# Patient Record
Sex: MALE | Race: WHITE | NOT HISPANIC OR LATINO | Employment: FULL TIME | URBAN - METROPOLITAN AREA
[De-identification: names, ages, dates, MRNs, and addresses within clinical notes are randomized per-mention and may not be internally consistent; named-entity substitution may affect disease eponyms.]

---

## 2017-03-25 ENCOUNTER — TRANSFERRED RECORDS (OUTPATIENT)
Dept: HEALTH INFORMATION MANAGEMENT | Facility: CLINIC | Age: 42
End: 2017-03-25

## 2017-03-25 LAB
ALT SERPL-CCNC: 54 U/L (ref 9–46)
AST SERPL-CCNC: 43 U/L (ref 10–40)
CHOLEST SERPL-MCNC: 181 MG/DL (ref 125–200)
CREAT SERPL-MCNC: 1.08 MG/DL (ref 0.6–1.35)
GFR SERPL CREATININE-BSD FRML MDRD: 84 ML/MIN/1.73M2
GLUCOSE SERPL-MCNC: 99 MG/DL (ref 65–99)
HDLC SERPL-MCNC: 29 MG/DL
LDLC SERPL CALC-MCNC: 77 MG/DL
NONHDLC SERPL-MCNC: 152 MG/DL
POTASSIUM SERPL-SCNC: 4.3 MMOL/L (ref 3.5–5.3)
TRIGL SERPL-MCNC: 376 MG/DL

## 2017-04-07 ENCOUNTER — OFFICE VISIT (OUTPATIENT)
Dept: FAMILY MEDICINE | Facility: CLINIC | Age: 42
End: 2017-04-07
Payer: COMMERCIAL

## 2017-04-07 VITALS
BODY MASS INDEX: 34.22 KG/M2 | SYSTOLIC BLOOD PRESSURE: 136 MMHG | HEIGHT: 70 IN | HEART RATE: 62 BPM | TEMPERATURE: 98.3 F | WEIGHT: 239 LBS | DIASTOLIC BLOOD PRESSURE: 86 MMHG | RESPIRATION RATE: 16 BRPM

## 2017-04-07 DIAGNOSIS — R09.81 NASAL CONGESTION: ICD-10-CM

## 2017-04-07 DIAGNOSIS — I10 BENIGN HYPERTENSION: Primary | ICD-10-CM

## 2017-04-07 DIAGNOSIS — E78.5 HYPERLIPIDEMIA LDL GOAL <160: ICD-10-CM

## 2017-04-07 PROCEDURE — 99213 OFFICE O/P EST LOW 20 MIN: CPT | Performed by: PHYSICIAN ASSISTANT

## 2017-04-07 RX ORDER — FLUTICASONE PROPIONATE 50 MCG
1-2 SPRAY, SUSPENSION (ML) NASAL DAILY
Qty: 16 G | Refills: 3 | Status: SHIPPED | OUTPATIENT
Start: 2017-04-07 | End: 2018-04-24

## 2017-04-07 RX ORDER — LISINOPRIL 10 MG/1
10 TABLET ORAL DAILY
Qty: 90 TABLET | Refills: 3 | Status: SHIPPED | OUTPATIENT
Start: 2017-04-07 | End: 2018-04-24

## 2017-04-07 RX ORDER — CETIRIZINE HYDROCHLORIDE 10 MG/1
10 TABLET ORAL DAILY
COMMUNITY

## 2017-04-07 RX ORDER — ATORVASTATIN CALCIUM 20 MG/1
20 TABLET, FILM COATED ORAL DAILY
Qty: 90 TABLET | Refills: 3 | Status: SHIPPED | OUTPATIENT
Start: 2017-04-07 | End: 2018-04-24

## 2017-04-07 NOTE — PROGRESS NOTES
SUBJECTIVE:                                                    Sascha Pacheco is a 41 year old male who presents to clinic today for the following health issues:      Hypertension Follow-up      Outpatient blood pressures are not being checked. Did have BP checked 2 weeks ago at px normal 120/80    Low Salt Diet: not monitoring salt       Amount of exercise or physical activity: 3-4 days/week    Problems taking medications regularly: No    Medication side effects:     Diet: regular (no restrictions)      Hyperlipidemia Follow-Up      Rate your low fat/cholesterol diet?: not monitoring fat    Taking statin?  Yes, no muscle aches from statin. However has noted over the last year has been experiencing new nasal congestion and headaches and troubles staying.     Other lipid medications/supplements?:  none       CMP, CBC, and lipids were all checked 2 weeks ago through insurance. Triglycerides were ~350 and hdl was 29. Renal function, blood sugar, and electrolytes were all normal. ALT was 54 and the remainder of LFTs normal. CBC was normal.       Problem list and histories reviewed & adjusted, as indicated.  Additional history: as documented    Patient Active Problem List   Diagnosis     History of Hodgkin's disease     Hyperlipidemia LDL goal <160     Glaucoma     Arthropathy of multiple sites     Shoulder pain     Personal history of radiation therapy     Benign hypertension     Osteoarthritis of glenohumeral joint     Past Surgical History:   Procedure Laterality Date     ARTHROSCOPY SHOULDER DECOMPRESSION  12/15/2011    Procedure:ARTHROSCOPY SHOULDER DECOMPRESSION; Right Shoulder Arthroscopic Assessment, Subcromial Decompression, microfracture cohondrail defect repair  ; Surgeon:CAROLE WALKER; Location:[facility] OR     C APPENDECTOMY       HC KNEE SCOPE,MED/LAT MENISECTOMY  12/2004    medial meniscectomy, right knee     HC VASECTOMY UNILAT/BILAT W POSTOP SEMEN       SURGICAL HISTORY OF -       resection Hodgkins,  "salivary glands, node dissection       Social History   Substance Use Topics     Smoking status: Former Smoker     Quit date: 1/1/1996     Smokeless tobacco: Never Used     Alcohol use 0.0 oz/week     0 Standard drinks or equivalent per week      Comment: rarely     Family History   Problem Relation Age of Onset     CANCER Mother      melanoma     C.A.D. Maternal Grandfather      DIABETES Maternal Aunt          Current Outpatient Prescriptions   Medication Sig Dispense Refill     lisinopril (PRINIVIL/ZESTRIL) 10 MG tablet Take 1 tablet (10 mg) by mouth daily 90 tablet 3     atorvastatin (LIPITOR) 20 MG tablet Take 1 tablet (20 mg) by mouth daily 90 tablet 3     fluticasone (FLONASE) 50 MCG/ACT spray Spray 1-2 sprays into both nostrils daily 16 g 3     cetirizine (ZYRTEC) 10 MG tablet Take 10 mg by mouth daily       latanoprost (XALATAN) 0.005 % ophthalmic solution Place 1 drop into both eyes At Bedtime.       [DISCONTINUED] lisinopril (PRINIVIL,ZESTRIL) 10 MG tablet Take 1 tablet (10 mg) by mouth daily 90 tablet 3     Allergies   Allergen Reactions     No Known Allergies      BP Readings from Last 3 Encounters:   04/07/17 136/86   06/03/16 110/70   05/15/16 (!) 164/100    Wt Readings from Last 3 Encounters:   04/07/17 239 lb (108.4 kg)   06/03/16 225 lb (102.1 kg)   05/15/16 225 lb (102.1 kg)                    Reviewed and updated as needed this visit by clinical staff  Tobacco  Allergies  Meds  Problems  Med Hx  Surg Hx  Fam Hx  Soc Hx        Reviewed and updated as needed this visit by Provider  Allergies  Meds  Problems         ROS:  Constitutional, HEENT, cardiovascular, pulmonary, gi and gu systems are negative, except as otherwise noted.    OBJECTIVE:                                                    /86 (BP Location: Right arm, Patient Position: Chair, Cuff Size: Adult Large)  Pulse 62  Temp 98.3  F (36.8  C) (Oral)  Resp 16  Ht 5' 10\" (1.778 m)  Wt 239 lb (108.4 kg)  BMI 34.29 " kg/m2  Body mass index is 34.29 kg/(m^2).  GENERAL: healthy, alert, no distress and over weight  EYES: Eyes grossly normal to inspection, PERRL and conjunctivae and sclerae normal  HENT: normal cephalic/atraumatic, both ears: clear effusion, nasal mucosa edematous , oropharynx clear, oral mucous membranes moist and sinuses: not tender  NECK: no adenopathy, no asymmetry, masses, or scars and thyroid normal to palpation  RESP: lungs clear to auscultation - no rales, rhonchi or wheezes  CV: regular rates and rhythm, normal S1 S2, no S3 or S4 and no murmur, click or rub  ABDOMEN: soft, nontender, no hepatosplenomegaly, no masses and bowel sounds normal  PSYCH: mentation appears normal, affect normal/bright    Diagnostic Test Results:  none      ASSESSMENT/PLAN:                                                      (I10) Benign hypertension  (primary encounter diagnosis)  Comment: stable. Labs were normal 2 weeks ago. Labs scanned into chart. Refilled yearly follow up recommended.   Plan: lisinopril (PRINIVIL/ZESTRIL) 10 MG tablet        -Medication use and side effects discussed with the patient. Patient is in complete understanding and agreement with plan.       (E78.5) Hyperlipidemia LDL goal <160  Comment: triglycerides and hdl were abnormal. LDL stable. Restlessness at night may be cause of zocor. Will attempt lipitor instead. Patient to monitor for side effects. Maintain yearly labs.   Plan: atorvastatin (LIPITOR) 20 MG tablet        -Medication use and side effects discussed with the patient. Patient is in complete understanding and agreement with plan.       (R09.81) Nasal congestion  Comment: patient informed this is likely allergies. I do not believe this is side effect of medications. Patient does state is currently taking daily zyrtec. Will add flonase to regimen. If no improvement in ~1 month, patient should RTC. Sooner if worsening.   Plan: fluticasone (FLONASE) 50 MCG/ACT spray        -Medication use and  side effects discussed with the patient. Patient is in complete understanding and agreement with plan.         Follow up: as above     Kel Ragland PA-C  [facility]

## 2017-04-07 NOTE — MR AVS SNAPSHOT
After Visit Summary   4/7/2017    Sascha Pacheco    MRN: [id number]           Patient Information     Date Of Birth          1975        Visit Information        Provider Department      4/7/2017 8:45 AM Kel Ragland PA-C [facility]        Today's Diagnoses     Hyperlipidemia LDL goal <160    -  1    Benign hypertension        Nasal congestion          Care Instructions      Sinus Headache    The sinuses are air-filled spaces within the bones of the face. They connect to the inside of the nose. Sinusitis is an inflammation of the tissue lining the sinus cavity. Sinus inflammation can occur during a cold or hay fever (allergies to pollens and other particles in the air) and cause symptoms of sinus congestion and fullness and perhaps a low-grade fever. An infection is usually present when there is also facial pain or headache and green or yellow drainage from the nose or into the back of the throat (postnasal drip). Antibiotics are often prescribed to treat this condition.  Sinus headache may cause pain in different places, depending on which sinuses are infected. There may be pain in the temples, forehead, top of the head, behind or around the eye, across the cheekbone, or into the upper teeth.  You may find that changing your position, sitting upright or lying down, will bring some relief.  Home care  The following guidelines will help you care for yourself at home:  1. Drink plenty of water, hot tea, and other liquids to stay well hydrated. This thins the mucus and promotes sinus drainage.  2. Apply heat to the painful areas of the face. Use a towel soaked in hot water. Or,  the shower and direct the hot spray onto your face. This is a good way to inhale warm water vapor and get heat on your face at the same time. (Cover your mouth and nose with your hands so you can still breathe as you do this.)  3. Use a cool mist vaporizer at night or breathe in  steam from a hot shower. Suck on peppermint, menthol, or eucalyptus hard candies during the day.  4. An expectorant containing guaifenesin helps thin the mucus and promote drainage from the sinuses.  5. Over-the-counter decongestants may be used unless a similar medicine was prescribed. Nasal sprays work the fastest. Use one that contains phenylephrine or oxymetazoline. First blow the nose gently to remove mucus. Then apply the drops. Don't use decongestant nasal sprays more often than the label says or for more than 3 days. This can make symptoms worse. Nasal sprays prescribed from your doctor typically do not have these restrictions. Check with your doctor or pharmacist. You may also use tablets containing pseudoephedrine. Many sinus remedies combine ingredients, which may increase side effects. Also, if you are taking a combination medicine with another medicine, be sure you are not taking a double dose of anything by mistake. Read the labels or ask the pharmacist for help. [NOTE: Those with high blood pressure should not use decongestants. They can raise blood pressure.]  6. Antihistamines are useful if allergies are a cause of your sinusitis. You can get chlorpheniramine and diphenhydramine over the counter, but these can cause drowsiness. [NOTE: Don't use these if you have glaucoma or if you are a man with trouble urinating due to an enlarged prostate.] Over-the-counter antihistamines containing loratidine and cetirizine cause less drowsiness and are a good choice for daytime use.  7. When allergies are the cause for sinusitis, a saline nasal rinse may give relief. Saline nasal rinse reduces swelling and clears excess mucus. This allows sinuses to drain. Prepackaged kits are available at most drugstores. These contain premixed salt packets and an irrigation device. If antibiotics have been prescribed to treat an acute sinus infection, talk to your doctor before using a nasal rinse to be sure it is safe for  you.  8. You may use acetaminophen or ibuprofen to control pain, unless another pain medicine was prescribed. [NOTE: If you have chronic liver or kidney disease or ever had a stomach ulcer, talk with your doctor before using these medicines.] (Aspirin should never be used in anyone under 18 years of age who has a fever. It may cause severe liver damage.)  9. If antibiotics were given, finish all of them, even if you are feeling better after a few days.  Follow-up care  Follow up with your doctor or this facility in 1 week or as instructed by our staff if not improving.  When to seek medical care  Get prompt medical attention if any of the following occur:    Facial pain or headache becomes more severe    Stiff neck    Unusual drowsiness or confusion    Swelling of the forehead or eyelids    Vision problems including blurred or double vision    Fever over 100.4  F (38.0  C) oral for more than 3 days on antibiotics    Bleeding from the nose or throat    Seizure    4699-8660 [employer]. [address] [place] [address]. All rights reserved. This information is not intended as a substitute for professional medical care. Always follow your healthcare professional's instructions.              Follow-ups after your visit        Who to contact     If you have questions or need follow up information about today's clinic visit or your schedule please contact [facility] directly at [phone].  Normal or non-critical lab and imaging results will be communicated to you by MyChart, letter or phone within 4 business days after the clinic has received the results. If you do not hear from us within 7 days, please contact the clinic through MyChart or phone. If you have a critical or abnormal lab result, we will notify you by phone as soon as possible.  Submit refill requests through [employer] or call your pharmacy and they will forward the refill request to us. Please allow 3 business days  "for your refill to be completed.          Additional Information About Your Visit        [employer] Information     [employer] lets you send messages to your doctor, view your test results, renew your prescriptions, schedule appointments and more. To sign up, go to [email or web address] . Click on \"Log in\" on the left side of the screen, which will take you to the Welcome page. Then click on \"Sign up Now\" on the right side of the page.     You will be asked to enter the access code listed below, as well as some personal information. Please follow the directions to create your username and password.     Your access code is: [email or web address]  Expires: 2017  9:08 AM     Your access code will  in 90 days. If you need help or a new code, please call your [place] clinic or [phone].        Care EveryWhere ID     This is your Care EveryWhere ID. This could be used by other organizations to access your [place] medical records  [id number]        Your Vitals Were     Pulse Temperature Respirations Height BMI (Body Mass Index)       62 98.3  F (36.8  C) (Oral) 16 5' 10\" (1.778 m) 34.29 kg/m2        Blood Pressure from Last 3 Encounters:   17 136/86   16 110/70   05/15/16 (!) 164/100    Weight from Last 3 Encounters:   17 239 lb (108.4 kg)   16 225 lb (102.1 kg)   05/15/16 225 lb (102.1 kg)              Today, you had the following     No orders found for display         Today's Medication Changes          These changes are accurate as of: 17  9:08 AM.  If you have any questions, ask your nurse or doctor.               Start taking these medicines.        Dose/Directions    atorvastatin 20 MG tablet   Commonly known as:  LIPITOR   Used for:  Hyperlipidemia LDL goal <160   Started by:  Kel Ragland PA-C        Dose:  20 mg   Take 1 tablet (20 mg) by mouth daily   Quantity:  90 tablet   Refills:  3       fluticasone 50 MCG/ACT spray   Commonly known as:  FLONASE   Used for:  Nasal " congestion   Started by:  Kel Ragland PA-C        Dose:  1-2 spray   Spray 1-2 sprays into both nostrils daily   Quantity:  16 g   Refills:  3         Stop taking these medicines if you haven't already. Please contact your care team if you have questions.     simvastatin 20 MG tablet   Commonly known as:  ZOCOR   Stopped by:  Kel Ragland PA-C                Where to get your medicines      These medications were sent to [id number] - [place], [place] - [address]  [address] , [facility] [address]     Phone:  [phone]     atorvastatin 20 MG tablet    fluticasone 50 MCG/ACT spray    lisinopril 10 MG tablet                Primary Care Provider Office Phone # Fax #    Rob Coronado [phone]       [facility] [address] [place] [address] [address] [place] [address]        Thank you!     Thank you for choosing [facility]  for your care. Our goal is always to provide you with excellent care. Hearing back from our patients is one way we can continue to improve our services. Please take a few minutes to complete the written survey that you may receive in the mail after your visit with us. Thank you!             Your Updated Medication List - Protect others around you: Learn how to safely use, store and throw away your medicines at www.disposemymeds.org.          This list is accurate as of: 4/7/17  9:08 AM.  Always use your most recent med list.                   Brand Name Dispense Instructions for use    atorvastatin 20 MG tablet    LIPITOR    90 tablet    Take 1 tablet (20 mg) by mouth daily       fluticasone 50 MCG/ACT spray    FLONASE    16 g    Spray 1-2 sprays into both nostrils daily       lisinopril 10 MG tablet    PRINIVIL/ZESTRIL    90 tablet    Take 1 tablet (10 mg) by mouth daily       XALATAN 0.005 % ophthalmic solution   Generic drug:  latanoprost      Place 1 drop into both eyes At Bedtime.

## 2017-04-07 NOTE — NURSING NOTE
"Chief Complaint   Patient presents with     Hypertension       Initial /86 (BP Location: Right arm, Patient Position: Chair, Cuff Size: Adult Large)  Pulse 62  Temp 98.3  F (36.8  C) (Oral)  Resp 16  Ht 5' 10\" (1.778 m)  Wt 239 lb (108.4 kg)  BMI 34.29 kg/m2 Estimated body mass index is 34.29 kg/(m^2) as calculated from the following:    Height as of this encounter: 5' 10\" (1.778 m).    Weight as of this encounter: 239 lb (108.4 kg).  Medication Reconciliation: complete    "

## 2017-04-07 NOTE — PATIENT INSTRUCTIONS
Sinus Headache    The sinuses are air-filled spaces within the bones of the face. They connect to the inside of the nose. Sinusitis is an inflammation of the tissue lining the sinus cavity. Sinus inflammation can occur during a cold or hay fever (allergies to pollens and other particles in the air) and cause symptoms of sinus congestion and fullness and perhaps a low-grade fever. An infection is usually present when there is also facial pain or headache and green or yellow drainage from the nose or into the back of the throat (postnasal drip). Antibiotics are often prescribed to treat this condition.  Sinus headache may cause pain in different places, depending on which sinuses are infected. There may be pain in the temples, forehead, top of the head, behind or around the eye, across the cheekbone, or into the upper teeth.  You may find that changing your position, sitting upright or lying down, will bring some relief.  Home care  The following guidelines will help you care for yourself at home:  1. Drink plenty of water, hot tea, and other liquids to stay well hydrated. This thins the mucus and promotes sinus drainage.  2. Apply heat to the painful areas of the face. Use a towel soaked in hot water. Or,  the shower and direct the hot spray onto your face. This is a good way to inhale warm water vapor and get heat on your face at the same time. (Cover your mouth and nose with your hands so you can still breathe as you do this.)  3. Use a cool mist vaporizer at night or breathe in steam from a hot shower. Suck on peppermint, menthol, or eucalyptus hard candies during the day.  4. An expectorant containing guaifenesin helps thin the mucus and promote drainage from the sinuses.  5. Over-the-counter decongestants may be used unless a similar medicine was prescribed. Nasal sprays work the fastest. Use one that contains phenylephrine or oxymetazoline. First blow the nose gently to remove mucus. Then apply the  drops. Don't use decongestant nasal sprays more often than the label says or for more than 3 days. This can make symptoms worse. Nasal sprays prescribed from your doctor typically do not have these restrictions. Check with your doctor or pharmacist. You may also use tablets containing pseudoephedrine. Many sinus remedies combine ingredients, which may increase side effects. Also, if you are taking a combination medicine with another medicine, be sure you are not taking a double dose of anything by mistake. Read the labels or ask the pharmacist for help. [NOTE: Those with high blood pressure should not use decongestants. They can raise blood pressure.]  6. Antihistamines are useful if allergies are a cause of your sinusitis. You can get chlorpheniramine and diphenhydramine over the counter, but these can cause drowsiness. [NOTE: Don't use these if you have glaucoma or if you are a man with trouble urinating due to an enlarged prostate.] Over-the-counter antihistamines containing loratidine and cetirizine cause less drowsiness and are a good choice for daytime use.  7. When allergies are the cause for sinusitis, a saline nasal rinse may give relief. Saline nasal rinse reduces swelling and clears excess mucus. This allows sinuses to drain. Prepackaged kits are available at [facility]. These contain premixed salt packets and an irrigation device. If antibiotics have been prescribed to treat an acute sinus infection, talk to your doctor before using a nasal rinse to be sure it is safe for you.  8. You may use acetaminophen or ibuprofen to control pain, unless another pain medicine was prescribed. [NOTE: If you have chronic liver or kidney disease or ever had a stomach ulcer, talk with your doctor before using these medicines.] (Aspirin should never be used in anyone under 18 years of age who has a fever. It may cause severe liver damage.)  9. If antibiotics were given, finish all of them, even if you are feeling  better after a few days.  Follow-up care  Follow up with your doctor or this facility in 1 week or as instructed by our staff if not improving.  When to seek medical care  Get prompt medical attention if any of the following occur:    Facial pain or headache becomes more severe    Stiff neck    Unusual drowsiness or confusion    Swelling of the forehead or eyelids    Vision problems including blurred or double vision    Fever over 100.4  F (38.0  C) oral for more than 3 days on antibiotics    Bleeding from the nose or throat    Seizure    8506-0757 [employer]. [address] [place] [address]. All rights reserved. This information is not intended as a substitute for professional medical care. Always follow your healthcare professional's instructions.

## 2018-03-24 ENCOUNTER — TRANSFERRED RECORDS (OUTPATIENT)
Dept: HEALTH INFORMATION MANAGEMENT | Facility: CLINIC | Age: 43
End: 2018-03-24

## 2018-03-24 LAB
ALT SERPL-CCNC: 43 U/L (ref 9–46)
AST SERPL-CCNC: 40 U/L (ref 10–40)
CHOLEST SERPL-MCNC: 181 MG/DL
CREAT SERPL-MCNC: 1.16 MG/DL (ref 0.6–1.35)
GFR SERPL CREATININE-BSD FRML MDRD: 77 ML/MIN/1.73M2
GLUCOSE SERPL-MCNC: 91 MG/DL (ref 65–99)
HDLC SERPL-MCNC: 29 MG/DL
LDLC SERPL CALC-MCNC: 117 MG/DL
NONHDLC SERPL-MCNC: 152 MG/DL
POTASSIUM SERPL-SCNC: 4.2 MMOL/L (ref 3.5–5.3)
TRIGL SERPL-MCNC: 224 MG/DL

## 2018-04-24 ENCOUNTER — OFFICE VISIT (OUTPATIENT)
Dept: FAMILY MEDICINE | Facility: CLINIC | Age: 43
End: 2018-04-24
Payer: COMMERCIAL

## 2018-04-24 VITALS
SYSTOLIC BLOOD PRESSURE: 134 MMHG | HEIGHT: 69 IN | BODY MASS INDEX: 33.77 KG/M2 | TEMPERATURE: 98 F | RESPIRATION RATE: 12 BRPM | DIASTOLIC BLOOD PRESSURE: 78 MMHG | WEIGHT: 228 LBS | HEART RATE: 56 BPM

## 2018-04-24 DIAGNOSIS — E78.5 HYPERLIPIDEMIA LDL GOAL <160: Primary | ICD-10-CM

## 2018-04-24 DIAGNOSIS — R09.81 NASAL CONGESTION: ICD-10-CM

## 2018-04-24 DIAGNOSIS — I10 BENIGN HYPERTENSION: ICD-10-CM

## 2018-04-24 PROCEDURE — 99213 OFFICE O/P EST LOW 20 MIN: CPT | Performed by: PHYSICIAN ASSISTANT

## 2018-04-24 RX ORDER — ATORVASTATIN CALCIUM 20 MG/1
20 TABLET, FILM COATED ORAL DAILY
Qty: 90 TABLET | Refills: 3 | Status: SHIPPED | OUTPATIENT
Start: 2018-04-24 | End: 2019-03-18

## 2018-04-24 RX ORDER — LISINOPRIL 10 MG/1
10 TABLET ORAL DAILY
Qty: 90 TABLET | Refills: 3 | Status: SHIPPED | OUTPATIENT
Start: 2018-04-24 | End: 2019-03-18

## 2018-04-24 RX ORDER — FLUTICASONE PROPIONATE 50 MCG
1-2 SPRAY, SUSPENSION (ML) NASAL DAILY
Qty: 16 G | Refills: 11 | Status: SHIPPED | OUTPATIENT
Start: 2018-04-24 | End: 2019-06-28

## 2018-04-24 ASSESSMENT — PATIENT HEALTH QUESTIONNAIRE - PHQ9
10. IF YOU CHECKED OFF ANY PROBLEMS, HOW DIFFICULT HAVE THESE PROBLEMS MADE IT FOR YOU TO DO YOUR WORK, TAKE CARE OF THINGS AT HOME, OR GET ALONG WITH OTHER PEOPLE: NOT DIFFICULT AT ALL
SUM OF ALL RESPONSES TO PHQ QUESTIONS 1-9: 2
SUM OF ALL RESPONSES TO PHQ QUESTIONS 1-9: 2

## 2018-04-24 NOTE — MR AVS SNAPSHOT
"              After Visit Summary   2018    Sascha Pacheco    MRN: [id number]           Patient Information     Date Of Birth          1975        Visit Information        Provider Department      2018 2:45 PM Kel Ragland PA-C [facility]        Today's Diagnoses     Nasal congestion        Hyperlipidemia LDL goal <160        Benign hypertension           Follow-ups after your visit        Who to contact     If you have questions or need follow up information about today's clinic visit or your schedule please contact [facility] directly at [phone].  Normal or non-critical lab and imaging results will be communicated to you by [employer], letter or phone within 4 business days after the clinic has received the results. If you do not hear from us within 7 days, please contact the clinic through [employer] or phone. If you have a critical or abnormal lab result, we will notify you by phone as soon as possible.  Submit refill requests through [employer] or call your pharmacy and they will forward the refill request to us. Please allow 3 business days for your refill to be completed.          Additional Information About Your Visit        MyChart Information     [employer] lets you send messages to your doctor, view your test results, renew your prescriptions, schedule appointments and more. To sign up, go to [email or web address] . Click on \"Log in\" on the left side of the screen, which will take you to the Welcome page. Then click on \"Sign up Now\" on the right side of the page.     You will be asked to enter the access code listed below, as well as some personal information. Please follow the directions to create your username and password.     Your access code is: [email or web address]  Expires: 2018  3:35 PM     Your access code will  in 90 days. If you need help or a new code, please call your [facility] or [phone].        Care EveryWhere " "ID     This is your Care EveryWhere ID. This could be used by other organizations to access your [place] medical records  [id number]        Your Vitals Were     Pulse Temperature Respirations Height BMI (Body Mass Index)       56 98  F (36.7  C) (Oral) 12 5' 9\" (1.753 m) 33.67 kg/m2        Blood Pressure from Last 3 Encounters:   04/24/18 134/78   04/07/17 136/86   06/03/16 110/70    Weight from Last 3 Encounters:   04/24/18 228 lb (103.4 kg)   04/07/17 239 lb (108.4 kg)   06/03/16 225 lb (102.1 kg)              Today, you had the following     No orders found for display         Where to get your medicines      These medications were sent to [id number] - [place], [place] - [address]  [address], [facility] [address]     Phone:  [phone]     atorvastatin 20 MG tablet    fluticasone 50 MCG/ACT spray    lisinopril 10 MG tablet          Primary Care Provider Office Phone # Fax #    Rob Coronado [phone]       [facility] [address]        Equal Access to Services     [facility] AH: Hadii aad ku hadasho Soomaali, waaxda luqadaha, qaybta kaalmada adeegyada, alexi gilbert. So [facility] [phone].    ATENCIÓN: Si habla español, tiene a avila disposición servicios gratuitos de asistencia lingüística. Violette al [phone].    We comply with applicable federal civil rights laws and [place] laws. We do not discriminate on the basis of race, color, national origin, age, disability, sex, sexual orientation, or gender identity.            Thank you!     Thank you for choosing [facility]  for your care. Our goal is always to provide you with excellent care. Hearing back from our patients is one way we can continue to improve our services. Please take a few minutes to complete the written survey that you may receive in the mail after your visit with us. Thank you!             Your Updated Medication List - Protect others " around you: Learn how to safely use, store and throw away your medicines at www.disposemymeds.org.          This list is accurate as of 4/24/18  3:35 PM.  Always use your most recent med list.                   Brand Name Dispense Instructions for use Diagnosis    atorvastatin 20 MG tablet    LIPITOR    90 tablet    Take 1 tablet (20 mg) by mouth daily    Hyperlipidemia LDL goal <160       cetirizine 10 MG tablet    zyrTEC     Take 10 mg by mouth daily        fluticasone 50 MCG/ACT spray    FLONASE    16 g    Spray 1-2 sprays into both nostrils daily    Nasal congestion       lisinopril 10 MG tablet    PRINIVIL/ZESTRIL    90 tablet    Take 1 tablet (10 mg) by mouth daily    Benign hypertension       XALATAN 0.005 % ophthalmic solution   Generic drug:  latanoprost      Place 1 drop into both eyes At Bedtime.

## 2018-04-24 NOTE — PROGRESS NOTES
SUBJECTIVE:   Sascha Pacheco is a 42 year old male who presents to clinic today for the following health issues:    Hyperlipidemia Follow-Up      Rate your low fat/cholesterol diet?: fair    Taking statin?  Yes, possible muscle aches from statin    Other lipid medications/supplements?:  None    Obtains labs through work.   Total cholesterol 181  hdl 29  Triglycerides 234  Ldl: 117    Hypertension Follow-up      Outpatient blood pressures are being checked at store.  Results are 120/80.    Low Salt Diet: low salt      Amount of exercise or physical activity: None    Problems taking medications regularly: No    Medication side effects: muscle aches    Diet: low salt    Cr: 1.16  GFR: 77  Potassium: 4.2      Answers for HPI/ROS submitted by the patient on 4/24/2018   If you checked off any problems, how difficult have these problems made it for you to do your work, take care of things at home, or get along with other people?: Not difficult at all  PHQ9 TOTAL SCORE: 2        Problem list and histories reviewed & adjusted, as indicated.  Additional history: as documented    Patient Active Problem List   Diagnosis     History of Hodgkin's disease     Hyperlipidemia LDL goal <160     Glaucoma     Arthropathy of multiple sites     Shoulder pain     Personal history of radiation therapy     Benign hypertension     Osteoarthritis of glenohumeral joint     Past Surgical History:   Procedure Laterality Date     ARTHROSCOPY SHOULDER DECOMPRESSION  12/15/2011    Procedure:ARTHROSCOPY SHOULDER DECOMPRESSION; Right Shoulder Arthroscopic Assessment, Subcromial Decompression, microfracture cohondrail defect repair  ; Surgeon:CAROLE WALKER; Location:[facility] OR     C APPENDECTOMY       HC KNEE SCOPE,MED/LAT MENISECTOMY  12/2004    medial meniscectomy, right knee     HC VASECTOMY UNILAT/BILAT W POSTOP SEMEN       SURGICAL HISTORY OF -       resection Hodgkins, salivary glands, node dissection       Social History   Substance Use  "Topics     Smoking status: Former Smoker     Quit date: 1/1/1996     Smokeless tobacco: Never Used     Alcohol use 0.0 oz/week     0 Standard drinks or equivalent per week      Comment: rarely     Family History   Problem Relation Age of Onset     CANCER Mother      melanoma     C.A.D. Maternal Grandfather      DIABETES Maternal Aunt          Current Outpatient Prescriptions   Medication Sig Dispense Refill     atorvastatin (LIPITOR) 20 MG tablet Take 1 tablet (20 mg) by mouth daily 90 tablet 3     cetirizine (ZYRTEC) 10 MG tablet Take 10 mg by mouth daily       fluticasone (FLONASE) 50 MCG/ACT spray Spray 1-2 sprays into both nostrils daily 16 g 11     latanoprost (XALATAN) 0.005 % ophthalmic solution Place 1 drop into both eyes At Bedtime.       lisinopril (PRINIVIL/ZESTRIL) 10 MG tablet Take 1 tablet (10 mg) by mouth daily 90 tablet 3     Allergies   Allergen Reactions     No Known Allergies      BP Readings from Last 3 Encounters:   04/24/18 134/78   04/07/17 136/86   06/03/16 110/70    Wt Readings from Last 3 Encounters:   04/24/18 228 lb (103.4 kg)   04/07/17 239 lb (108.4 kg)   06/03/16 225 lb (102.1 kg)                    Reviewed and updated as needed this visit by clinical staff  Tobacco  Allergies  Meds  Problems  Med Hx  Surg Hx  Fam Hx  Soc Hx        Reviewed and updated as needed this visit by Provider  Allergies  Meds  Problems         ROS:  Constitutional, msk, neuro, cardiovascular, pulmonary, gi and gu systems are negative, except as otherwise noted.    OBJECTIVE:     /78 (BP Location: Right arm, Patient Position: Chair, Cuff Size: Adult Large)  Pulse 56  Temp 98  F (36.7  C) (Oral)  Resp 12  Ht 5' 9\" (1.753 m)  Wt 228 lb (103.4 kg)  BMI 33.67 kg/m2  Body mass index is 33.67 kg/(m^2).  GENERAL: alert, no distress and obese  RESP: lungs clear to auscultation - no rales, rhonchi or wheezes  CV: regular rates and rhythm, normal S1 S2, no S3 or S4 and no murmur, click or " rub  PSYCH: mentation appears normal, affect normal/bright    Diagnostic Test Results:  none     ASSESSMENT/PLAN:     (E78.5) Hyperlipidemia LDL goal <160  (primary encounter diagnosis)  Comment: given muscle aches, will take holiday from lipitor. If symptoms resolve in 1 month, patient to call and pravastatin will be attempted. If no improvement, likely not resulting in lipitor and can be restarted.   Plan: atorvastatin (LIPITOR) 20 MG tablet        -Medication use and side effects discussed with the patient. Patient is in complete understanding and agreement with plan.       (I10) Benign hypertension  Comment: stable   Plan: lisinopril (PRINIVIL/ZESTRIL) 10 MG tablet            (R09.81) Nasal congestion  Comment:   Plan: fluticasone (FLONASE) 50 MCG/ACT spray              Follow up: as above     Kel Ragland PA-C  [facility]

## 2018-04-25 ASSESSMENT — PATIENT HEALTH QUESTIONNAIRE - PHQ9: SUM OF ALL RESPONSES TO PHQ QUESTIONS 1-9: 2

## 2018-11-23 ENCOUNTER — OFFICE VISIT (OUTPATIENT)
Dept: URGENT CARE | Facility: URGENT CARE | Age: 43
End: 2018-11-23
Payer: COMMERCIAL

## 2018-11-23 VITALS
HEART RATE: 67 BPM | DIASTOLIC BLOOD PRESSURE: 80 MMHG | WEIGHT: 237 LBS | TEMPERATURE: 98.5 F | SYSTOLIC BLOOD PRESSURE: 140 MMHG | OXYGEN SATURATION: 96 % | BODY MASS INDEX: 35 KG/M2

## 2018-11-23 DIAGNOSIS — B35.6 TINEA CRURIS: Primary | ICD-10-CM

## 2018-11-23 PROCEDURE — 99213 OFFICE O/P EST LOW 20 MIN: CPT | Performed by: PHYSICIAN ASSISTANT

## 2018-11-23 RX ORDER — NYSTATIN 100000 U/G
CREAM TOPICAL 3 TIMES DAILY
Qty: 30 G | Refills: 1 | Status: SHIPPED | OUTPATIENT
Start: 2018-11-23 | End: 2019-02-18

## 2018-11-23 ASSESSMENT — ENCOUNTER SYMPTOMS
DIARRHEA: 0
NAUSEA: 0
CHILLS: 0
FEVER: 0
VOMITING: 0
COUGH: 0

## 2018-11-23 NOTE — MR AVS SNAPSHOT
"              After Visit Summary   2018    Sascha Pacheco    MRN: [id number]           Patient Information     Date Of Birth          1975        Visit Information        Provider Department      2018 2:10 PM Adrienne Phelps PA-C [facility]        Today's Diagnoses     Tinea cruris    -  1       Follow-ups after your visit        Who to contact     If you have questions or need follow up information about today's clinic visit or your schedule please contact [facility] directly at [phone].  Normal or non-critical lab and imaging results will be communicated to you by [employer], letter or phone within 4 business days after the clinic has received the results. If you do not hear from us within 7 days, please contact the clinic through [employer] or phone. If you have a critical or abnormal lab result, we will notify you by phone as soon as possible.  Submit refill requests through [employer] or call your pharmacy and they will forward the refill request to us. Please allow 3 business days for your refill to be completed.          Additional Information About Your Visit        MyChart Information     [employer] lets you send messages to your doctor, view your test results, renew your prescriptions, schedule appointments and more. To sign up, go to [email or web address] . Click on \"Log in\" on the left side of the screen, which will take you to the Welcome page. Then click on \"Sign up Now\" on the right side of the page.     You will be asked to enter the access code listed below, as well as some personal information. Please follow the directions to create your username and password.     Your access code is: [email or web address]  Expires: 2019  4:30 PM     Your access code will  in 90 days. If you need help or a new code, please call your [place] clinic or [phone].        Care EveryWhere ID     This is your Care EveryWhere ID. This could be used by " other organizations to access your [place] medical records  [id number]        Your Vitals Were     Pulse Temperature Pulse Oximetry BMI (Body Mass Index)          67 98.5  F (36.9  C) (Oral) 96% 35 kg/m2         Blood Pressure from Last 3 Encounters:   11/23/18 140/80   04/24/18 134/78   04/07/17 136/86    Weight from Last 3 Encounters:   11/23/18 237 lb (107.5 kg)   04/24/18 228 lb (103.4 kg)   04/07/17 239 lb (108.4 kg)              Today, you had the following     No orders found for display         Today's Medication Changes          These changes are accurate as of 11/23/18  4:30 PM.  If you have any questions, ask your nurse or doctor.               Start taking these medicines.        Dose/Directions    nystatin cream   Commonly known as:  MYCOSTATIN   Used for:  Tinea cruris   Started by:  Adrienne Phelps PA-C        Apply topically 3 times daily for 14 days To affected areas   Quantity:  30 g   Refills:  1            Where to get your medicines      These medications were sent to [id number] - [place], [place] - [address]  [address], [facility] [address]     Phone:  [phone]     nystatin cream                Primary Care Provider Office Phone # Fax #    Rob Coronado [phone]       [facility] [place] [address]        Equal Access to Services     [facility] AH: Hadii landon barnhart hadasho Sojessicaali, waaxda luqadaha, qaybta kaalmada jaime, alexi dupree . So [facility] [phone].    ATENCIÓN: Si habla español, tiene a avila disposición servicios gratuitos de asistencia lingüística. Llame al [phone].    We comply with applicable federal civil rights laws and [place] laws. We do not discriminate on the basis of race, color, national origin, age, disability, sex, sexual orientation, or gender identity.            Thank you!     Thank you for choosing [facility]  for your care. Our goal is always to  provide you with excellent care. Hearing back from our patients is one way we can continue to improve our services. Please take a few minutes to complete the written survey that you may receive in the mail after your visit with us. Thank you!             Your Updated Medication List - Protect others around you: Learn how to safely use, store and throw away your medicines at www.disposemymeds.org.          This list is accurate as of 11/23/18  4:30 PM.  Always use your most recent med list.                   Brand Name Dispense Instructions for use Diagnosis    atorvastatin 20 MG tablet    LIPITOR    90 tablet    Take 1 tablet (20 mg) by mouth daily    Hyperlipidemia LDL goal <160       cetirizine 10 MG tablet    zyrTEC     Take 10 mg by mouth daily        fluticasone 50 MCG/ACT spray    FLONASE    16 g    Spray 1-2 sprays into both nostrils daily    Nasal congestion       lisinopril 10 MG tablet    PRINIVIL/ZESTRIL    90 tablet    Take 1 tablet (10 mg) by mouth daily    Benign hypertension       nystatin cream    MYCOSTATIN    30 g    Apply topically 3 times daily for 14 days To affected areas    Tinea cruris       XALATAN 0.005 % ophthalmic solution   Generic drug:  latanoprost      Place 1 drop into both eyes At Bedtime.

## 2018-11-23 NOTE — PROGRESS NOTES
SUBJECTIVE:   Sascha Pacheco is a 43 year old male presenting with a chief complaint of   Chief Complaint   Patient presents with     Urgent Care     Derm Problem     rash in groin for 2 weeks , tried lotramin, desitin, and aquaphor        He is an established patient of [facility].    Rash    Onset of rash was 2 week(s) ago.   Course of illness is unchanged.  Severity moderate  Current and Associated symptoms: itching   Location of the rash: groin  Previous history of a similar rash? No  Recent exposure history: none known. He is a  and has been wearing a few more layers when outside.  Denies exposure to: new household products, new skincare products and recent immunization  Associated symptoms include: nothing.  Treatment measures tried include: Desitin, lotrimin  Denies f/c/n//v/d      Review of Systems   Constitutional: Negative for chills and fever.   Respiratory: Negative for cough.    Gastrointestinal: Negative for diarrhea, nausea and vomiting.   Skin: Positive for rash.       Past Medical History:   Diagnosis Date     Glaucoma      Other and unspecified hyperlipidemia      Personal history of Hodgkin's disease 2000    radiation rx, stage 1A, neck mass     Recurrent Depression/Anxiety 4/7/2005     Recurrent Depression/Anxiety, Full Remission 4/7/2005     Typical Chest Pain, Related to Anxiety 8/2004    negative workup including EKG and stress echo      Family History   Problem Relation Age of Onset     Cancer Mother      melanoma     C.A.D. Maternal Grandfather      Diabetes Maternal Aunt      Current Outpatient Prescriptions   Medication Sig Dispense Refill     atorvastatin (LIPITOR) 20 MG tablet Take 1 tablet (20 mg) by mouth daily 90 tablet 3     cetirizine (ZYRTEC) 10 MG tablet Take 10 mg by mouth daily       latanoprost (XALATAN) 0.005 % ophthalmic solution Place 1 drop into both eyes At Bedtime.       lisinopril (PRINIVIL/ZESTRIL) 10 MG tablet Take 1 tablet (10 mg) by mouth daily  90 tablet 3     nystatin (MYCOSTATIN) cream Apply topically 3 times daily for 14 days To affected areas 30 g 1     fluticasone (FLONASE) 50 MCG/ACT spray Spray 1-2 sprays into both nostrils daily (Patient not taking: Reported on 11/23/2018) 16 g 11     Social History   Substance Use Topics     Smoking status: Former Smoker     Quit date: 1/1/1996     Smokeless tobacco: Never Used     Alcohol use 0.0 oz/week     0 Standard drinks or equivalent per week      Comment: rarely       OBJECTIVE  /80  Pulse 67  Temp 98.5  F (36.9  C) (Oral)  Wt 237 lb (107.5 kg)  SpO2 96%  BMI 35 kg/m2    Physical Exam   Constitutional: He appears well-developed and well-nourished. No distress.   HENT:   Head: Normocephalic and atraumatic.   Eyes: Conjunctivae are normal.   Neck: Normal range of motion.   Pulmonary/Chest: Effort normal and breath sounds normal. No respiratory distress.   Neurological: He is alert.   Skin: Skin is warm and dry. Rash noted.   Mildly erythematous area noted bilateral groin, demarcated borders, right worse than left. No wound. No drainage. No tenderness to palpation.   Psychiatric: He has a normal mood and affect.   Nursing note and vitals reviewed.      Labs:  No results found for this or any previous visit (from the past 24 hour(s)).        ASSESSMENT:      ICD-10-CM    1. Tinea cruris B35.6 nystatin (MYCOSTATIN) cream        Medical Decision Making:    Differential Diagnosis:  Rash: Inflammation  Tinea cruris    Serious Comorbid Conditions:  Adult:  None    PLAN:    Tinea cruris: Nystatin Rx. Keep area dry as much as possible. Follow up if any worsening symptoms. He agrees.     Followup:    If not improving or if condition worsens, follow up with your Primary Care Provider

## 2019-02-15 ENCOUNTER — TRANSFERRED RECORDS (OUTPATIENT)
Dept: HEALTH INFORMATION MANAGEMENT | Facility: CLINIC | Age: 44
End: 2019-02-15

## 2019-02-15 LAB
ALT SERPL-CCNC: 61 U/L (ref 9–46)
AST SERPL-CCNC: 42 U/L (ref 10–40)
CHOLEST SERPL-MCNC: 219 MG/DL
CREAT SERPL-MCNC: 1 MG/DL (ref 0.6–1.35)
GFR SERPL CREATININE-BSD FRML MDRD: 91 ML/MIN/1.73M2
GLUCOSE SERPL-MCNC: 98 MG/DL (ref 65–99)
HDLC SERPL-MCNC: 33 MG/DL
NONHDLC SERPL-MCNC: 186 MG/DL
TRIGL SERPL-MCNC: 422 MG/DL

## 2019-02-18 ENCOUNTER — ANCILLARY PROCEDURE (OUTPATIENT)
Dept: GENERAL RADIOLOGY | Facility: CLINIC | Age: 44
End: 2019-02-18
Attending: FAMILY MEDICINE
Payer: COMMERCIAL

## 2019-02-18 ENCOUNTER — OFFICE VISIT (OUTPATIENT)
Dept: FAMILY MEDICINE | Facility: CLINIC | Age: 44
End: 2019-02-18
Payer: COMMERCIAL

## 2019-02-18 VITALS
SYSTOLIC BLOOD PRESSURE: 134 MMHG | OXYGEN SATURATION: 97 % | HEIGHT: 69 IN | BODY MASS INDEX: 35.25 KG/M2 | HEART RATE: 76 BPM | DIASTOLIC BLOOD PRESSURE: 86 MMHG | TEMPERATURE: 97.9 F | WEIGHT: 238 LBS

## 2019-02-18 DIAGNOSIS — J20.9 ACUTE BRONCHITIS WITH SYMPTOMS > 10 DAYS: ICD-10-CM

## 2019-02-18 DIAGNOSIS — R05.9 COUGH: ICD-10-CM

## 2019-02-18 DIAGNOSIS — R05.9 COUGH: Primary | ICD-10-CM

## 2019-02-18 PROCEDURE — 71046 X-RAY EXAM CHEST 2 VIEWS: CPT

## 2019-02-18 PROCEDURE — 99214 OFFICE O/P EST MOD 30 MIN: CPT | Performed by: FAMILY MEDICINE

## 2019-02-18 RX ORDER — BENZONATATE 100 MG/1
100 CAPSULE ORAL 3 TIMES DAILY PRN
Qty: 42 CAPSULE | Refills: 0 | Status: SHIPPED | OUTPATIENT
Start: 2019-02-18 | End: 2019-03-18

## 2019-02-18 RX ORDER — ALBUTEROL SULFATE 90 UG/1
2 AEROSOL, METERED RESPIRATORY (INHALATION) EVERY 4 HOURS PRN
Qty: 1 INHALER | Refills: 0 | Status: SHIPPED | OUTPATIENT
Start: 2019-02-18 | End: 2020-02-28

## 2019-02-18 RX ORDER — PREDNISONE 20 MG/1
20 TABLET ORAL DAILY
Qty: 5 TABLET | Refills: 0 | Status: SHIPPED | OUTPATIENT
Start: 2019-02-18 | End: 2019-03-18

## 2019-02-18 ASSESSMENT — MIFFLIN-ST. JEOR: SCORE: 1959.94

## 2019-02-18 ASSESSMENT — PATIENT HEALTH QUESTIONNAIRE - PHQ9: SUM OF ALL RESPONSES TO PHQ QUESTIONS 1-9: 0

## 2019-02-18 NOTE — PATIENT INSTRUCTIONS
Patient Education     Acute Bronchitis  Your healthcare provider has told you that you have acute bronchitis. Bronchitis is infection or inflammation of the bronchial tubes (airways in the lungs). Normally, air moves easily in and out of the airways. Bronchitis narrows the airways, making it harder for air to flow in and out of the lungs. This causes symptoms such as shortness of breath, coughing up yellow or green mucus, and wheezing. Bronchitis can be acute or chronic. Acute means the condition comes on quickly and goes away in a short time, usually within 3 to 10 days. Chronic means a condition lasts a long time and often comes back.    What causes acute bronchitis?  Acute bronchitis almost always starts as a viral respiratory infection, such as a cold or the flu. Certain factors make it more likely for a cold or flu to turn into bronchitis. These include being very young, being elderly, having a heart or lung problem, or having a weak immune system. Cigarette smoking also makes bronchitis more likely.  When bronchitis develops, the airways become swollen. The airways may also become infected with bacteria. This is known as a secondary infection.  Diagnosing acute bronchitis  Your healthcare provider will examine you and ask about your symptoms and health history. You may also have a sputum culture to test the fluid in your lungs. Chest X-rays may be done to look for infection in the lungs.  Treating acute bronchitis  Bronchitis usually clears up as the cold or flu goes away. You can help feel better faster by doing the following:    Take medicine as directed. You may be told to take ibuprofen or other over-the-counter medicines. These help relieve inflammation in your bronchial tubes. Your healthcare provider may prescribe an inhaler to help open up the bronchial tubes. Most of the time, acute bronchitis is caused by a viral infection. Antibiotics are usually not prescribed for viral infections.    Drink  plenty of fluids, such as water, juice, or warm soup. Fluids loosen mucus so that you can cough it up. This helps you breathe more easily. Fluids also prevent dehydration.    Make sure you get plenty of rest.    Do not smoke. Do not allow anyone else to smoke in your home.  Recovery and follow-up  Follow up with your doctor as you are told. You will likely feel better in a week or two. But a dry cough can linger beyond that time. Let your doctor know if you still have symptoms (other than a dry cough) after 2 weeks, or if you re prone to getting bronchial infections. Take steps to protect yourself from future infections. These steps include stopping smoking and avoiding tobacco smoke, washing your hands often, and getting a yearly flu shot.  When to call your healthcare provider  Call the healthcare provider if you have any of the following:    Fever of 100.4 F (38.0 C) or higher, or as advised    Symptoms that get worse, or new symptoms    Trouble breathing    Symptoms that don t start to improve within a week, or within 3 days of taking antibiotics   Date Last Reviewed: 12/1/2016 2000-2018 [employer]. [address] [place] [address], [place] [address]. All rights reserved. This information is not intended as a substitute for professional medical care. Always follow your healthcare professional's instructions.

## 2019-02-18 NOTE — PROGRESS NOTES
SUBJECTIVE:   Sascha Pacheco is a 43 year old male who presents to clinic today for the following health issues:      Acute Illness   Acute illness concerns: x 4 weeks  Onset: see below    Fever: no     Chills/Sweats: no     Headache (location?): no     Sinus Pressure:YES    Conjunctivitis:  no    Ear Pain: no    Rhinorrhea: YES    Congestion: YES    Sore Throat: no      Cough: YES - phlem    Wheeze: YES- tightness, SOB    Decreased Appetite: no     Nausea: no     Vomiting: no     Diarrhea:  no     Dysuria/Freq.: no     Fatigue/Achiness: YES    Sick/Strep Exposure: YES     Therapies Tried and outcome: Augmentin x 10 days, robitussin, mucinex         Problem list and histories reviewed & adjusted, as indicated.  Additional history: as documented    Patient Active Problem List   Diagnosis     History of Hodgkin's disease     Hyperlipidemia LDL goal <160     Glaucoma     Arthropathy of multiple sites     Shoulder pain     Personal history of radiation therapy     Benign hypertension     Osteoarthritis of glenohumeral joint     Past Surgical History:   Procedure Laterality Date     ARTHROSCOPY SHOULDER DECOMPRESSION  12/15/2011    Procedure:ARTHROSCOPY SHOULDER DECOMPRESSION; Right Shoulder Arthroscopic Assessment, Subcromial Decompression, microfracture cohondrail defect repair  ; Surgeon:CAROLE WALKER; Location:[facility] OR     C APPENDECTOMY       HC KNEE SCOPE,MED/LAT MENISECTOMY  2004    medial meniscectomy, right knee     HC VASECTOMY UNILAT/BILAT W POSTOP SEMEN       SURGICAL HISTORY OF -       resection Hodgkins, salivary glands, node dissection       Social History     Tobacco Use     Smoking status: Former Smoker     Last attempt to quit: 1996     Years since quittin.1     Smokeless tobacco: Never Used   Substance Use Topics     Alcohol use: Yes     Alcohol/week: 0.0 oz     Comment: rarely     Family History   Problem Relation Age of Onset     Cancer Mother         melanoma     C.A.D. Maternal  "Grandfather      Diabetes Maternal Aunt            Reviewed and updated as needed this visit by clinical staff  Allergies  Meds       Reviewed and updated as needed this visit by Provider         ROS:  Constitutional, HEENT, cardiovascular, pulmonary, gi  systems are negative, except as otherwise noted.    OBJECTIVE:     /86 (BP Location: Right arm, Patient Position: Chair, Cuff Size: Adult Large)   Pulse 76   Temp 97.9  F (36.6  C) (Oral)   Ht 1.753 m (5' 9\")   Wt 108 kg (238 lb)   SpO2 97%   BMI 35.15 kg/m    Body mass index is 35.15 kg/m .  GENERAL: healthy, alert and no distress  HENT: ear canals and TM's normal, nose and mouth without ulcers or lesions  NECK: no adenopathy, no asymmetry, masses, or scars and thyroid normal to palpation  RESP: mild wheezing upper lung fields otherwise unremarkable   CV: regular rate and rhythm, normal S1 S2      Diagnostic Test Results:  CXR - I independently visualized the xray: no obvious infiltrates noted     ASSESSMENT/PLAN:         ICD-10-CM    1. Cough R05 XR Chest 2 Views   2. Acute bronchitis with symptoms > 10 days J20.9 predniSONE (DELTASONE) 20 MG tablet     benzonatate (TESSALON) 100 MG capsule     albuterol (PROAIR HFA/PROVENTIL HFA/VENTOLIN HFA) 108 (90 Base) MCG/ACT inhaler     - no indication for another round of abx  - will start on course of steroid  - supportive care discussed  - RTC if no improvement in symptoms in next 1-2 weeks or sooner if needed     See Patient Instructions    Paloma Ballard MD  [facility]"

## 2019-03-18 ENCOUNTER — OFFICE VISIT (OUTPATIENT)
Dept: FAMILY MEDICINE | Facility: CLINIC | Age: 44
End: 2019-03-18
Payer: COMMERCIAL

## 2019-03-18 VITALS
DIASTOLIC BLOOD PRESSURE: 79 MMHG | TEMPERATURE: 98 F | BODY MASS INDEX: 35.84 KG/M2 | HEIGHT: 69 IN | OXYGEN SATURATION: 98 % | HEART RATE: 60 BPM | RESPIRATION RATE: 16 BRPM | SYSTOLIC BLOOD PRESSURE: 139 MMHG | WEIGHT: 242 LBS

## 2019-03-18 DIAGNOSIS — E78.5 HYPERLIPIDEMIA LDL GOAL <160: ICD-10-CM

## 2019-03-18 DIAGNOSIS — R74.8 ELEVATED LIVER ENZYMES: Primary | ICD-10-CM

## 2019-03-18 DIAGNOSIS — I10 BENIGN HYPERTENSION: ICD-10-CM

## 2019-03-18 DIAGNOSIS — E66.01 MORBID OBESITY (H): ICD-10-CM

## 2019-03-18 LAB
ALBUMIN SERPL-MCNC: 4 G/DL (ref 3.4–5)
ALP SERPL-CCNC: 60 U/L (ref 40–150)
ALT SERPL W P-5'-P-CCNC: 69 U/L (ref 0–70)
AST SERPL W P-5'-P-CCNC: 58 U/L (ref 0–45)
BILIRUB DIRECT SERPL-MCNC: 0.1 MG/DL (ref 0–0.2)
BILIRUB SERPL-MCNC: 0.4 MG/DL (ref 0.2–1.3)
ERYTHROCYTE [DISTWIDTH] IN BLOOD BY AUTOMATED COUNT: 13.9 % (ref 10–15)
HBV SURFACE AG SERPL QL IA: NONREACTIVE
HCT VFR BLD AUTO: 41.5 % (ref 40–53)
HCV AB SERPL QL IA: NONREACTIVE
HGB BLD-MCNC: 14.1 G/DL (ref 13.3–17.7)
IRON SATN MFR SERPL: 30 % (ref 15–46)
IRON SERPL-MCNC: 72 UG/DL (ref 35–180)
MCH RBC QN AUTO: 30.9 PG (ref 26.5–33)
MCHC RBC AUTO-ENTMCNC: 34 G/DL (ref 31.5–36.5)
MCV RBC AUTO: 91 FL (ref 78–100)
PLATELET # BLD AUTO: 208 10E9/L (ref 150–450)
PROT SERPL-MCNC: 7.3 G/DL (ref 6.8–8.8)
RBC # BLD AUTO: 4.57 10E12/L (ref 4.4–5.9)
TIBC SERPL-MCNC: 239 UG/DL (ref 240–430)
WBC # BLD AUTO: 5.8 10E9/L (ref 4–11)

## 2019-03-18 PROCEDURE — 36415 COLL VENOUS BLD VENIPUNCTURE: CPT | Performed by: FAMILY MEDICINE

## 2019-03-18 PROCEDURE — 83550 IRON BINDING TEST: CPT | Performed by: FAMILY MEDICINE

## 2019-03-18 PROCEDURE — 86803 HEPATITIS C AB TEST: CPT | Performed by: FAMILY MEDICINE

## 2019-03-18 PROCEDURE — 99214 OFFICE O/P EST MOD 30 MIN: CPT | Performed by: FAMILY MEDICINE

## 2019-03-18 PROCEDURE — 83540 ASSAY OF IRON: CPT | Performed by: FAMILY MEDICINE

## 2019-03-18 PROCEDURE — 85027 COMPLETE CBC AUTOMATED: CPT | Performed by: FAMILY MEDICINE

## 2019-03-18 PROCEDURE — 87340 HEPATITIS B SURFACE AG IA: CPT | Performed by: FAMILY MEDICINE

## 2019-03-18 PROCEDURE — 80076 HEPATIC FUNCTION PANEL: CPT | Performed by: FAMILY MEDICINE

## 2019-03-18 RX ORDER — LISINOPRIL 10 MG/1
10 TABLET ORAL DAILY
Qty: 90 TABLET | Refills: 3 | Status: SHIPPED | OUTPATIENT
Start: 2019-03-18 | End: 2020-02-28

## 2019-03-18 RX ORDER — ATORVASTATIN CALCIUM 20 MG/1
20 TABLET, FILM COATED ORAL DAILY
Qty: 90 TABLET | Refills: 3 | Status: SHIPPED | OUTPATIENT
Start: 2019-03-18 | End: 2020-02-28

## 2019-03-18 ASSESSMENT — MIFFLIN-ST. JEOR: SCORE: 1978.08

## 2019-03-18 NOTE — PROGRESS NOTES
SUBJECTIVE:   Sascha Pacheco is a 43 year old male who presents to clinic today for the following health issues:      History of Present Illness     Hyperlipidemia:     Low fat/chol diet rating::  Not monitoring fat    Taking Statins::  YES    Lipid Medications or Supplements::  None    Hypertension:     Outpatient blood pressures:  Are being checked    Blood pressures checked at:  The store    Dietary sodium intake::  Not monitoring salt intake    Diet:  Breakfast skipped  Frequency of exercise:  None  Taking medications regularly:  Yes  Medication side effects:  None  Additional concerns today:  Yes    Hyperlipidemia Follow-Up      Rate your low fat/cholesterol diet?: good    Taking statin?  Yes, no muscle aches from statin    Other lipid medications/supplements?:  none    Hypertension Follow-up      Outpatient blood pressures are not being checked.    Low Salt Diet: not monitoring salt    Problem list and histories reviewed & adjusted, as indicated.  Additional history: pt had a physical and noticed to have elevated liver test, and he was concerned bout, that and his abnormal white count that was slightly elevated too.  Pt did have asthma exacerbation that caused him to take steroids, and he has the blood test at the same time when he was on steroid boost.          Patient Active Problem List   Diagnosis     History of Hodgkin's disease     Hyperlipidemia LDL goal <160     Glaucoma     Arthropathy of multiple sites     Shoulder pain     Personal history of radiation therapy     Benign hypertension     Osteoarthritis of glenohumeral joint     Obesity (BMI 35.0-39.9) with comorbidity (H)     Past Surgical History:   Procedure Laterality Date     ARTHROSCOPY SHOULDER DECOMPRESSION  12/15/2011    Procedure:ARTHROSCOPY SHOULDER DECOMPRESSION; Right Shoulder Arthroscopic Assessment, Subcromial Decompression, microfracture cohondrail defect repair  ; Surgeon:CAROLE WALKER; Location:[facility] OR     C APPENDECTOMY    "    HC KNEE SCOPE,MED/LAT MENISECTOMY  2004    medial meniscectomy, right knee     HC VASECTOMY UNILAT/BILAT W POSTOP SEMEN       SURGICAL HISTORY OF -       resection Hodgkins, salivary glands, node dissection       Social History     Tobacco Use     Smoking status: Former Smoker     Last attempt to quit: 1996     Years since quittin.2     Smokeless tobacco: Never Used   Substance Use Topics     Alcohol use: Yes     Alcohol/week: 0.0 oz     Comment: rarely     Family History   Problem Relation Age of Onset     Cancer Mother         melanoma     C.A.D. Maternal Grandfather      Diabetes Maternal Aunt          Current Outpatient Medications   Medication Sig Dispense Refill     albuterol (PROAIR HFA/PROVENTIL HFA/VENTOLIN HFA) 108 (90 Base) MCG/ACT inhaler Inhale 2 puffs into the lungs every 4 hours as needed for shortness of breath / dyspnea, wheezing or other (cough) 1 Inhaler 0     atorvastatin (LIPITOR) 20 MG tablet Take 1 tablet (20 mg) by mouth daily 90 tablet 3     cetirizine (ZYRTEC) 10 MG tablet Take 10 mg by mouth daily       fluticasone (FLONASE) 50 MCG/ACT spray Spray 1-2 sprays into both nostrils daily 16 g 11     latanoprost (XALATAN) 0.005 % ophthalmic solution Place 1 drop into both eyes At Bedtime.       lisinopril (PRINIVIL/ZESTRIL) 10 MG tablet Take 1 tablet (10 mg) by mouth daily 90 tablet 3     Allergies   Allergen Reactions     No Known Allergies        ROS:  CONSTITUTIONAL: NEGATIVE for fever, chills, change in weight  RESP: NEGATIVE for significant cough or SOB  CV: NEGATIVE for chest pain, palpitations or peripheral edema    OBJECTIVE:     /79 (BP Location: Right arm, Patient Position: Chair, Cuff Size: Adult Large)   Pulse 60   Temp 98  F (36.7  C) (Oral)   Resp 16   Ht 1.753 m (5' 9\")   Wt 109.8 kg (242 lb)   SpO2 98%   BMI 35.74 kg/m    Body mass index is 35.74 kg/m .  GENERAL: healthy, alert and no distress  NECK: no adenopathy, no asymmetry, masses, or scars and " thyroid normal to palpation  RESP: lungs clear to auscultation - no rales, rhonchi or wheezes  CV: regular rate and rhythm, normal S1 S2, no S3 or S4, no murmur, click or rub, no peripheral edema and peripheral pulses strong  MS: no gross musculoskeletal defects noted, no edema    Diagnostic Test Results:  Results for orders placed or performed in visit on 03/18/19 (from the past 24 hour(s))   CBC with platelets   Result Value Ref Range    WBC 5.8 4.0 - 11.0 10e9/L    RBC Count 4.57 4.4 - 5.9 10e12/L    Hemoglobin 14.1 13.3 - 17.7 g/dL    Hematocrit 41.5 40.0 - 53.0 %    MCV 91 78 - 100 fl    MCH 30.9 26.5 - 33.0 pg    MCHC 34.0 31.5 - 36.5 g/dL    RDW 13.9 10.0 - 15.0 %    Platelet Count 208 150 - 450 10e9/L       ASSESSMENT/PLAN:             1. Benign hypertension  Controlled, continue on same medications  - lisinopril (PRINIVIL/ZESTRIL) 10 MG tablet; Take 1 tablet (10 mg) by mouth daily  Dispense: 90 tablet; Refill: 3  - CBC with platelets    2. Hyperlipidemia LDL goal <160  Controlled, high TG Noticed on his labs last, maybe related to steroids, but mostly related to obesity, normal blood sugar.  Strongly advised to loose weight and start on exercise program, talked in details bout diet   - atorvastatin (LIPITOR) 20 MG tablet; Take 1 tablet (20 mg) by mouth daily  Dispense: 90 tablet; Refill: 3    3. Elevated liver enzymes  Mostly fatty liver disease, will check below labs  - Hepatic panel  - Iron and iron binding capacity  - Hepatitis B surface antigen  - Hepatitis C Screen Reflex to HCV RNA Quant and Genotype    4. Morbid obesity (H)  Today I counseled the patient about diet, regular exercise and weight loss planning.        FUTURE APPOINTMENTS: folow up with the lab results, if normal        - Follow-up visit in 1 year.    Savi Elmore MD  [facility]

## 2019-03-19 ENCOUNTER — TELEPHONE (OUTPATIENT)
Dept: FAMILY MEDICINE | Facility: CLINIC | Age: 44
End: 2019-03-19

## 2019-03-19 DIAGNOSIS — R79.89 ELEVATED LIVER FUNCTION TESTS: Primary | ICD-10-CM

## 2019-03-19 NOTE — TELEPHONE ENCOUNTER
Please call Sascha, all labs are back to normal, except for slightly high liver test.  Which I think it related to fatty liver disease, this will get better with weight loss and exercise.  Meanwhile, would it be possible to order US of the abdomen to confirm the diagnosis?  Savi Elmore MD  [facility]  [phone]

## 2019-03-19 NOTE — TELEPHONE ENCOUNTER
Patient returned the call and agree to do the US. With his insurance if he does this at [facility] it is no cost to him.    Please sign order and faxed it to [facility] at [phone].    [facility]  [address]  [place] [address]  Ph:[phone]  Fax:[phone]  Scheduling line:[phone]      Anna Dempsey

## 2019-03-20 NOTE — TELEPHONE ENCOUNTER
The order is in, can we please print and fax it?  Savi Elmore MD  [facility]  [phone]

## 2019-03-21 ENCOUNTER — TRANSFERRED RECORDS (OUTPATIENT)
Dept: HEALTH INFORMATION MANAGEMENT | Facility: CLINIC | Age: 44
End: 2019-03-21

## 2019-03-29 ENCOUNTER — TELEPHONE (OUTPATIENT)
Dept: FAMILY MEDICINE | Facility: CLINIC | Age: 44
End: 2019-03-29

## 2019-03-29 DIAGNOSIS — K76.0 STEATOSIS OF LIVER: Primary | ICD-10-CM

## 2019-03-29 NOTE — TELEPHONE ENCOUNTER
Patient had an ultrasound on his liver a few days ago. Patient called stating that results were sent to us. I am not seeing any results in his charts.    Please call with results once we get those.    Qing PORRAS - TC/FD  3/29/2019 1:12 PM

## 2019-04-03 PROBLEM — K76.0 STEATOSIS OF LIVER: Status: ACTIVE | Noted: 2019-04-03

## 2019-04-03 NOTE — TELEPHONE ENCOUNTER
I feel bad as the results were not sent to us.  His US of is showing fatty liver as we expected (also showed gallstones that we don't recommend any treatment at this time)  In regards to his fatty liver,, please make sure you start weight loss plan, exercise 3 hours/week, and start eating more vegetables, fruits, nuts, and fish products (twice weekly), and eat less of processed food, fast food, white bread/pasta, baked sweets, bagels,and fried food.  Recheck with me in 1 year.  Savi Elmore MD  [facility]  [phone]

## 2019-06-28 DIAGNOSIS — R09.81 NASAL CONGESTION: ICD-10-CM

## 2019-06-28 RX ORDER — FLUTICASONE PROPIONATE 50 MCG
1-2 SPRAY, SUSPENSION (ML) NASAL DAILY
Qty: 16 G | Refills: 8 | Status: SHIPPED | OUTPATIENT
Start: 2019-06-28 | End: 2020-02-28

## 2019-11-06 ENCOUNTER — HEALTH MAINTENANCE LETTER (OUTPATIENT)
Age: 44
End: 2019-11-06

## 2019-12-04 ENCOUNTER — NURSE TRIAGE (OUTPATIENT)
Dept: NURSING | Facility: CLINIC | Age: 44
End: 2019-12-04

## 2019-12-04 NOTE — TELEPHONE ENCOUNTER
"43 y/o male calls about a fast heart rate that woke him from his sleep, feels hot, the \"austin\" on his phone captured a pulse right now of 105-115, he denies chest pain, no sob, no skipped beats or extra beats, no dizziness or lightheadedness, not diaphoretic or unable to walk, no lower extremity swelling, no hx of heart disease. He does report that he got a steroid  Injection earlier today.    RN provide education on steroid injections, may some increased heart rates with steroids that should pass and not get worse or painful. I discussed lifestyle choices like regular sleep, good diet, decrease stress with exercise can help, stay hydrated, avoid drinks with excessive caffeine right now (pop, tea, Mountain Dew, energy drinks) limit alcohol.  RN advised to call back with any changes, worsening of symptoms, and questions or concerns.     Dipti Pacheco RN - [place] Nurse Advisor  12/04/2019  3:50AM    Reason for Disposition    [1] Palpitations AND [2] no improvement after using CARE ADVICE    Additional Information    Negative: Passed out (i.e., lost consciousness, collapsed and was not responding)    Negative: Shock suspected (e.g., cold/pale/clammy skin, too weak to stand, low BP, rapid pulse)    Negative: Difficult to awaken or acting confused (e.g., disoriented, slurred speech)    Negative: Visible sweat on face or sweat dripping down face    Negative: Unable to walk, or can only walk with assistance (e.g., requires support)    Negative: [1] Received SHOCK from implantable cardiac defibrillator AND [2] persisting symptoms (i.e., palpitations, lightheadedness)    Negative: Sounds like a life-threatening emergency to the triager    Negative: Difficulty breathing    Negative: Dizziness, lightheadedness, or weakness    Negative: [1] Heart beating very rapidly (e.g., > 140 / minute) AND [2] present now  (Exception: during exercise)    Negative: Heart beating very slowly (e.g., < 50 / minute)  (Exception: athlete)    " "Negative: New or worsened shortness of breath with activity (dyspnea on exertion)    Negative: Patient sounds very sick or weak to the triager    Negative: [1] Skipped or extra beat(s) AND [2] increases with exercise or exertion    Negative: [1] Skipped or extra beat(s) AND [2] occurs 4 or more times per minute    Negative: [1] Heart beating very rapidly (e.g., > 140 / minute) AND [2] not present now  (Exception: during exercise)    Negative: New or worsened ankle swelling    Negative: History of heart disease  (i.e., heart attack, bypass surgery, angina, angioplasty, CHF) (Exception: brief heart beat symptoms that went away and now feels well)    Negative: Age > 60 years (Exception: brief heart beat symptoms that went away and now feels well)    Negative: Taking water pill (i.e., diuretic) or heart medication (e.g., digoxin)    Negative: Wearing a \"holter monitor\" or \"cardiac event monitor\"    Negative: [1] Received SHOCK from implantable cardiac defibrillator AND [2] now feels well    Negative: History of hyperthyroidism or taking thyroid medication    Negative: Known or suspected substance abuse (e.g., cocaine, alcohol abuse)    Protocols used: HEART RATE AND HEARTBEAT [id number]      "

## 2020-02-01 ENCOUNTER — TRANSFERRED RECORDS (OUTPATIENT)
Dept: HEALTH INFORMATION MANAGEMENT | Facility: CLINIC | Age: 45
End: 2020-02-01

## 2020-02-01 LAB
ALT SERPL-CCNC: 29 U/L (ref 9–46)
AST SERPL-CCNC: 28 U/L (ref 10–40)
CHOLEST SERPL-MCNC: 153 MG/DL
CREAT SERPL-MCNC: 1.07 MG/DL (ref 0.6–1.35)
GFR SERPL CREATININE-BSD FRML MDRD: 83 ML/MIN/1.73M2
GLUCOSE SERPL-MCNC: 108 MG/DL (ref 65–99)
HDLC SERPL-MCNC: 31 MG/DL
LDLC SERPL CALC-MCNC: 98 MG/DL
NONHDLC SERPL-MCNC: 122 MG/DL
POTASSIUM SERPL-SCNC: 4.3 MMOL/L (ref 3.5–5.3)
TRIGL SERPL-MCNC: 147 MG/DL

## 2020-02-28 ENCOUNTER — OFFICE VISIT (OUTPATIENT)
Dept: FAMILY MEDICINE | Facility: CLINIC | Age: 45
End: 2020-02-28
Payer: COMMERCIAL

## 2020-02-28 VITALS
RESPIRATION RATE: 16 BRPM | HEIGHT: 69 IN | WEIGHT: 232 LBS | SYSTOLIC BLOOD PRESSURE: 117 MMHG | TEMPERATURE: 97.7 F | DIASTOLIC BLOOD PRESSURE: 71 MMHG | BODY MASS INDEX: 34.36 KG/M2 | HEART RATE: 61 BPM

## 2020-02-28 DIAGNOSIS — E66.01 MORBID OBESITY (H): ICD-10-CM

## 2020-02-28 DIAGNOSIS — R73.09 ELEVATED GLUCOSE: Primary | ICD-10-CM

## 2020-02-28 DIAGNOSIS — J30.9 ALLERGIC RHINITIS, UNSPECIFIED SEASONALITY, UNSPECIFIED TRIGGER: ICD-10-CM

## 2020-02-28 DIAGNOSIS — E78.5 HYPERLIPIDEMIA LDL GOAL <160: ICD-10-CM

## 2020-02-28 DIAGNOSIS — I10 BENIGN HYPERTENSION: ICD-10-CM

## 2020-02-28 LAB — HBA1C MFR BLD: 5.6 % (ref 0–5.6)

## 2020-02-28 PROCEDURE — 36415 COLL VENOUS BLD VENIPUNCTURE: CPT | Performed by: FAMILY MEDICINE

## 2020-02-28 PROCEDURE — 83036 HEMOGLOBIN GLYCOSYLATED A1C: CPT | Performed by: FAMILY MEDICINE

## 2020-02-28 PROCEDURE — 99214 OFFICE O/P EST MOD 30 MIN: CPT | Performed by: FAMILY MEDICINE

## 2020-02-28 RX ORDER — FLUTICASONE PROPIONATE 50 MCG
1-2 SPRAY, SUSPENSION (ML) NASAL DAILY
Qty: 16 G | Refills: 8 | Status: SHIPPED | OUTPATIENT
Start: 2020-02-28 | End: 2021-03-18

## 2020-02-28 RX ORDER — LISINOPRIL 10 MG/1
10 TABLET ORAL DAILY
Qty: 90 TABLET | Refills: 3 | Status: SHIPPED | OUTPATIENT
Start: 2020-02-28 | End: 2021-03-18

## 2020-02-28 RX ORDER — ATORVASTATIN CALCIUM 20 MG/1
20 TABLET, FILM COATED ORAL DAILY
Qty: 90 TABLET | Refills: 3 | Status: SHIPPED | OUTPATIENT
Start: 2020-02-28 | End: 2021-03-18

## 2020-02-28 ASSESSMENT — PATIENT HEALTH QUESTIONNAIRE - PHQ9
SUM OF ALL RESPONSES TO PHQ QUESTIONS 1-9: 0
SUM OF ALL RESPONSES TO PHQ QUESTIONS 1-9: 0
10. IF YOU CHECKED OFF ANY PROBLEMS, HOW DIFFICULT HAVE THESE PROBLEMS MADE IT FOR YOU TO DO YOUR WORK, TAKE CARE OF THINGS AT HOME, OR GET ALONG WITH OTHER PEOPLE: NOT DIFFICULT AT ALL

## 2020-02-28 ASSESSMENT — ANXIETY QUESTIONNAIRES
4. TROUBLE RELAXING: NOT AT ALL
7. FEELING AFRAID AS IF SOMETHING AWFUL MIGHT HAPPEN: NOT AT ALL
GAD7 TOTAL SCORE: 0
GAD7 TOTAL SCORE: 0
3. WORRYING TOO MUCH ABOUT DIFFERENT THINGS: NOT AT ALL
2. NOT BEING ABLE TO STOP OR CONTROL WORRYING: NOT AT ALL
1. FEELING NERVOUS, ANXIOUS, OR ON EDGE: NOT AT ALL
7. FEELING AFRAID AS IF SOMETHING AWFUL MIGHT HAPPEN: NOT AT ALL
GAD7 TOTAL SCORE: 0
6. BECOMING EASILY ANNOYED OR IRRITABLE: NOT AT ALL
5. BEING SO RESTLESS THAT IT IS HARD TO SIT STILL: NOT AT ALL

## 2020-02-28 ASSESSMENT — MIFFLIN-ST. JEOR: SCORE: 1927.73

## 2020-02-28 NOTE — PROGRESS NOTES
Subjective     Sascha Pacheco is a 44 year old male who presents to clinic today for the following health issues:      Hypertension: He presents for follow up of hypertension.  He does check blood pressure  regularly outside of the clinic. Outpatient blood pressures have not been over 140/90. He does not follow a low salt diet.   History of Present Illness        Hyperlipidemia:  He presents for follow up of hyperlipidemia.  He is taking medication to lower cholesterol. He is not having myalgia or other side effects to statin medications.    Hypertension: He presents for follow up of hypertension.  He does check blood pressure  regularly outside of the clinic. Outpatient blood pressures have not been over 140/90. He does not follow a low salt diet.     He eats 0-1 servings of fruits and vegetables daily.He consumes 0 sweetened beverage(s) daily.He exercises with enough effort to increase his heart rate 30 to 60 minutes per day.  He exercises with enough effort to increase his heart rate 4 days per week.   He is taking medications regularly.     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 Here to get meds refilled.  Has dropped his triglycerides from 422 To 147. Admits for the first time in his life he is counting calories and also exercising.     Wt Readings from Last 4 Encounters:   02/28/20 105.2 kg (232 lb)   03/18/19 109.8 kg (242 lb)   02/18/19 108 kg (238 lb)   11/23/18 107.5 kg (237 lb)             Patient Active Problem List   Diagnosis     History of Hodgkin's disease     Hyperlipidemia LDL goal <160     Glaucoma     Arthropathy of multiple sites     Shoulder pain     Personal history of radiation therapy     Benign hypertension     Osteoarthritis of glenohumeral joint     Obesity (BMI 35.0-39.9) with comorbidity (H)     Steatosis of liver     Past Surgical History:   Procedure Laterality Date     ARTHROSCOPY SHOULDER DECOMPRESSION  12/15/2011    Procedure:ARTHROSCOPY SHOULDER DECOMPRESSION;  "Right Shoulder Arthroscopic Assessment, Subcromial Decompression, microfracture cohondrail defect repair  ; Surgeon:CAROLE WALKER; Location:[facility] OR     C APPENDECTOMY       HC KNEE SCOPE,MED/LAT MENISECTOMY  2004    medial meniscectomy, right knee     HC VASECTOMY UNILAT/BILAT W POSTOP SEMEN       SURGICAL HISTORY OF -       resection Hodgkins, salivary glands, node dissection       Social History     Tobacco Use     Smoking status: Former Smoker     Last attempt to quit: 1996     Years since quittin.1     Smokeless tobacco: Never Used   Substance Use Topics     Alcohol use: Yes     Alcohol/week: 0.0 standard drinks     Comment: rarely     Family History   Problem Relation Age of Onset     Cancer Mother         melanoma     Gallbladder Disease Mother      Gallbladder Disease Father      C.A.D. Maternal Grandfather      Diabetes Maternal Aunt            Reviewed and updated as needed this visit by Provider         Review of Systems   ROS COMP: Constitutional, HEENT, cardiovascular, pulmonary, GI, , musculoskeletal, neuro, skin, endocrine and psych systems are negative, except as otherwise noted.      Objective    /71 (BP Location: Right arm, Patient Position: Chair, Cuff Size: Adult Large)   Pulse 61   Temp 97.7  F (36.5  C) (Oral)   Resp 16   Ht 1.753 m (5' 9\")   Wt 105.2 kg (232 lb)   BMI 34.26 kg/m    Body mass index is 34.26 kg/m .  Physical Exam   GENERAL: healthy, alert and no distress  RESP: lungs clear to auscultation - no rales, rhonchi or wheezes  CV: regular rate and rhythm, normal S1 S2  MS: no edema  SKIN: no suspicious lesions or rashes  PSYCH: mentation appears normal, affect normal/bright    Diagnostic Test Results:  Labs reviewed in Epic  See scanned results         Assessment & Plan     1. Elevated glucose  - elevated fasting glucose on recent physical. Investigate further with A1c.  - Hemoglobin A1c    2. Hyperlipidemia LDL goal <160  - stable  - atorvastatin (LIPITOR) 20 " "MG tablet; Take 1 tablet (20 mg) by mouth daily  Dispense: 90 tablet; Refill: 3    3. Benign hypertension  - stable  - lisinopril (ZESTRIL) 10 MG tablet; Take 1 tablet (10 mg) by mouth daily  Dispense: 90 tablet; Refill: 3    4. Allergic rhinitis, unspecified seasonality, unspecified trigger  - stable   - fluticasone (FLONASE) 50 MCG/ACT nasal spray; Spray 1-2 sprays into both nostrils daily  Dispense: 16 g; Refill: 8    5. Morbid obesity (H)  - continue with diet and lifestyle changes        BMI:   Estimated body mass index is 34.26 kg/m  as calculated from the following:    Height as of this encounter: 1.753 m (5' 9\").    Weight as of this encounter: 105.2 kg (232 lb).   Weight management plan: Discussed healthy diet and exercise guidelines        See Patient Instructions    Return in about 1 year (around 2/28/2021).    Paloma Ballard MD  [facility]    Answers for HPI/ROS submitted by the patient on 2/28/2020   Chronic problems general questions HPI Form  If you checked off any problems, how difficult have these problems made it for you to do your work, take care of things at home, or get along with other people?: Not difficult at all  PHQ9 TOTAL SCORE: 0  GIDEON 7 TOTAL SCORE: 0    "

## 2020-02-29 ASSESSMENT — ANXIETY QUESTIONNAIRES: GAD7 TOTAL SCORE: 0

## 2020-11-29 ENCOUNTER — HEALTH MAINTENANCE LETTER (OUTPATIENT)
Age: 45
End: 2020-11-29

## 2021-03-18 ENCOUNTER — OFFICE VISIT (OUTPATIENT)
Dept: FAMILY MEDICINE | Facility: CLINIC | Age: 46
End: 2021-03-18
Payer: COMMERCIAL

## 2021-03-18 VITALS
SYSTOLIC BLOOD PRESSURE: 139 MMHG | DIASTOLIC BLOOD PRESSURE: 75 MMHG | BODY MASS INDEX: 31.4 KG/M2 | TEMPERATURE: 98 F | HEIGHT: 69 IN | WEIGHT: 212 LBS | OXYGEN SATURATION: 98 % | HEART RATE: 55 BPM | RESPIRATION RATE: 16 BRPM

## 2021-03-18 DIAGNOSIS — Z23 NEED FOR TDAP VACCINATION: Primary | ICD-10-CM

## 2021-03-18 DIAGNOSIS — J30.9 ALLERGIC RHINITIS, UNSPECIFIED SEASONALITY, UNSPECIFIED TRIGGER: ICD-10-CM

## 2021-03-18 DIAGNOSIS — I10 BENIGN HYPERTENSION: ICD-10-CM

## 2021-03-18 DIAGNOSIS — E78.5 HYPERLIPIDEMIA LDL GOAL <160: ICD-10-CM

## 2021-03-18 PROBLEM — E66.01 MORBID OBESITY (H): Status: RESOLVED | Noted: 2019-03-18 | Resolved: 2021-03-18

## 2021-03-18 LAB
ALBUMIN SERPL-MCNC: 4.2 G/DL (ref 3.4–5)
ALP SERPL-CCNC: 52 U/L (ref 40–150)
ALT SERPL W P-5'-P-CCNC: 45 U/L (ref 0–70)
ANION GAP SERPL CALCULATED.3IONS-SCNC: 4 MMOL/L (ref 3–14)
AST SERPL W P-5'-P-CCNC: 29 U/L (ref 0–45)
BILIRUB SERPL-MCNC: 0.7 MG/DL (ref 0.2–1.3)
BUN SERPL-MCNC: 18 MG/DL (ref 7–30)
CALCIUM SERPL-MCNC: 9 MG/DL (ref 8.5–10.1)
CHLORIDE SERPL-SCNC: 107 MMOL/L (ref 94–109)
CHOLEST SERPL-MCNC: 161 MG/DL
CO2 SERPL-SCNC: 28 MMOL/L (ref 20–32)
CREAT SERPL-MCNC: 1.03 MG/DL (ref 0.66–1.25)
GFR SERPL CREATININE-BSD FRML MDRD: 86 ML/MIN/{1.73_M2}
GLUCOSE SERPL-MCNC: 97 MG/DL (ref 70–99)
HDLC SERPL-MCNC: 41 MG/DL
LDLC SERPL CALC-MCNC: 101 MG/DL
NONHDLC SERPL-MCNC: 120 MG/DL
POTASSIUM SERPL-SCNC: 4.2 MMOL/L (ref 3.4–5.3)
PROT SERPL-MCNC: 7.6 G/DL (ref 6.8–8.8)
SODIUM SERPL-SCNC: 139 MMOL/L (ref 133–144)
TRIGL SERPL-MCNC: 97 MG/DL

## 2021-03-18 PROCEDURE — 99213 OFFICE O/P EST LOW 20 MIN: CPT | Mod: 25 | Performed by: NURSE PRACTITIONER

## 2021-03-18 PROCEDURE — 80061 LIPID PANEL: CPT | Performed by: NURSE PRACTITIONER

## 2021-03-18 PROCEDURE — 36415 COLL VENOUS BLD VENIPUNCTURE: CPT | Performed by: NURSE PRACTITIONER

## 2021-03-18 PROCEDURE — 90715 TDAP VACCINE 7 YRS/> IM: CPT | Performed by: NURSE PRACTITIONER

## 2021-03-18 PROCEDURE — 90471 IMMUNIZATION ADMIN: CPT | Performed by: NURSE PRACTITIONER

## 2021-03-18 PROCEDURE — 80053 COMPREHEN METABOLIC PANEL: CPT | Performed by: NURSE PRACTITIONER

## 2021-03-18 RX ORDER — LISINOPRIL 10 MG/1
10 TABLET ORAL DAILY
Qty: 90 TABLET | Refills: 3 | Status: SHIPPED | OUTPATIENT
Start: 2021-03-18 | End: 2022-05-31

## 2021-03-18 RX ORDER — ATORVASTATIN CALCIUM 20 MG/1
20 TABLET, FILM COATED ORAL DAILY
Qty: 90 TABLET | Refills: 3 | Status: SHIPPED | OUTPATIENT
Start: 2021-03-18 | End: 2022-06-02

## 2021-03-18 RX ORDER — FLUTICASONE PROPIONATE 50 MCG
1-2 SPRAY, SUSPENSION (ML) NASAL DAILY
Qty: 16 G | Refills: 8 | Status: SHIPPED | OUTPATIENT
Start: 2021-03-18 | End: 2022-06-02

## 2021-03-18 ASSESSMENT — ANXIETY QUESTIONNAIRES
5. BEING SO RESTLESS THAT IT IS HARD TO SIT STILL: NOT AT ALL
GAD7 TOTAL SCORE: 0
1. FEELING NERVOUS, ANXIOUS, OR ON EDGE: NOT AT ALL
3. WORRYING TOO MUCH ABOUT DIFFERENT THINGS: NOT AT ALL
GAD7 TOTAL SCORE: 0
GAD7 TOTAL SCORE: 0
2. NOT BEING ABLE TO STOP OR CONTROL WORRYING: NOT AT ALL
6. BECOMING EASILY ANNOYED OR IRRITABLE: NOT AT ALL
7. FEELING AFRAID AS IF SOMETHING AWFUL MIGHT HAPPEN: NOT AT ALL
7. FEELING AFRAID AS IF SOMETHING AWFUL MIGHT HAPPEN: NOT AT ALL
4. TROUBLE RELAXING: NOT AT ALL

## 2021-03-18 ASSESSMENT — PATIENT HEALTH QUESTIONNAIRE - PHQ9
SUM OF ALL RESPONSES TO PHQ QUESTIONS 1-9: 0
10. IF YOU CHECKED OFF ANY PROBLEMS, HOW DIFFICULT HAVE THESE PROBLEMS MADE IT FOR YOU TO DO YOUR WORK, TAKE CARE OF THINGS AT HOME, OR GET ALONG WITH OTHER PEOPLE: NOT DIFFICULT AT ALL
SUM OF ALL RESPONSES TO PHQ QUESTIONS 1-9: 0

## 2021-03-18 ASSESSMENT — MIFFLIN-ST. JEOR: SCORE: 1828.04

## 2021-03-18 NOTE — PROGRESS NOTES
"    Assessment & Plan     Hyperlipidemia LDL goal <160  Fasting today and opts to update labs when fasting. Refilled medication.   - atorvastatin (LIPITOR) 20 MG tablet  Dispense: 90 tablet; Refill: 3  - Lipid panel reflex to direct LDL Fasting    Benign hypertension  Controlled.   - lisinopril (ZESTRIL) 10 MG tablet  Dispense: 90 tablet; Refill: 3  - Comprehensive metabolic panel (BMP + Alb, Alk Phos, ALT, AST, Total. Bili, TP)    Allergic rhinitis, unspecified seasonality, unspecified trigger  Refilled for allergy season.   - fluticasone (FLONASE) 50 MCG/ACT nasal spray  Dispense: 16 g; Refill: 8    Is unsure of COVID-19 vaccine despite education today.  Will continue to consider. Will update Tdap today.      BMI:   Estimated body mass index is 31.54 kg/m  as calculated from the following:    Height as of this encounter: 1.746 m (5' 8.75\").    Weight as of this encounter: 96.2 kg (212 lb).           Return in about 1 year (around 3/18/2022) for Preventive Visit, Hypertension.    EDWARD Valles CNP  [facility]    Florentino Caicedo is a 45 year old who presents for the following health issues     Future Appointments   Date Time Provider Department Center   3/18/2021  8:30 AM Perla Wan APRN CNP [facility]     Appointment Notes for this encounter:   Annual exam??? Cholestrol & Blood pressure follow up    Health Maintenance Due   Topic Date Due     ADVANCE CARE PLANNING  Never done     DTAP/TDAP/TD IMMUNIZATION (2 - Td) 12/17/2013     Health Maintenance addressed:  NONE        MyChart Status:  Active and Using    Healthy Habits:     Taking medications regularly:  0    PHQ-2 Total Score: 0  History of Present Illness       Hyperlipidemia:  He presents for follow up of hyperlipidemia.  He is taking medication to lower cholesterol. He is not having myalgia or other side effects to statin medications.    Hypertension: He presents for follow up of hypertension.  He does not check blood " "pressure  regularly outside of the clinic. Outpatient blood pressures have not been over 140/90. He does not follow a low salt diet.     He eats 2-3 servings of fruits and vegetables daily.He consumes 1 sweetened beverage(s) daily.He exercises with enough effort to increase his heart rate 30 to 60 minutes per day.  He exercises with enough effort to increase his heart rate 5 days per week.   He is taking medications regularly.     Had physical 2/8/21: labs as below    2/8/2021  Total chol 169  HDL 33  Triglycerides 129    Ratio 5.1    Glucose 103    Has been working on weight loss with improved diet and activity.     Review of Systems   Constitutional, HEENT, cardiovascular, pulmonary, gi and gu systems are negative, except as otherwise noted.      Objective    /75 (BP Location: Left arm, Patient Position: Chair, Cuff Size: Adult Large)   Pulse 55   Temp 98  F (36.7  C) (Oral)   Resp 16   Ht 1.746 m (5' 8.75\")   Wt 96.2 kg (212 lb)   SpO2 98%   BMI 31.54 kg/m    Body mass index is 31.54 kg/m .  Physical Exam   GENERAL: healthy, alert and no distress  RESP: lungs clear to auscultation - no rales, rhonchi or wheezes  CV: regular rate and rhythm, normal S1 S2, no S3 or S4, no murmur, click or rub, no peripheral edema and peripheral pulses strong  MS: no gross musculoskeletal defects noted, no edema                Answers for HPI/ROS submitted by the patient on 3/18/2021   Chronic problems general questions HPI Form  If you checked off any problems, how difficult have these problems made it for you to do your work, take care of things at home, or get along with other people?: Not difficult at all  PHQ9 TOTAL SCORE: 0  GIDEON 7 TOTAL SCORE: 0    "

## 2021-03-19 ASSESSMENT — PATIENT HEALTH QUESTIONNAIRE - PHQ9: SUM OF ALL RESPONSES TO PHQ QUESTIONS 1-9: 0

## 2021-03-19 ASSESSMENT — ANXIETY QUESTIONNAIRES: GAD7 TOTAL SCORE: 0

## 2021-07-20 ENCOUNTER — HOSPITAL ENCOUNTER (EMERGENCY)
Facility: CLINIC | Age: 46
Discharge: HOME | End: 2021-07-20
Attending: EMERGENCY MEDICINE | Admitting: EMERGENCY MEDICINE
Payer: COMMERCIAL

## 2021-07-20 ENCOUNTER — APPOINTMENT (OUTPATIENT)
Dept: GENERAL RADIOLOGY | Facility: CLINIC | Age: 46
End: 2021-07-20
Attending: EMERGENCY MEDICINE
Payer: COMMERCIAL

## 2021-07-20 VITALS
TEMPERATURE: 97 F | RESPIRATION RATE: 20 BRPM | SYSTOLIC BLOOD PRESSURE: 162 MMHG | HEART RATE: 58 BPM | DIASTOLIC BLOOD PRESSURE: 97 MMHG | OXYGEN SATURATION: 97 %

## 2021-07-20 DIAGNOSIS — S60.121A SUBUNGUAL HEMATOMA OF RIGHT INDEX FINGER: ICD-10-CM

## 2021-07-20 DIAGNOSIS — S62.639A CLOSED FRACTURE OF TUFT OF DISTAL PHALANX OF FINGER: ICD-10-CM

## 2021-07-20 PROCEDURE — 26750 TREAT FINGER FRACTURE EACH: CPT | Mod: F7

## 2021-07-20 PROCEDURE — 99284 EMERGENCY DEPT VISIT MOD MDM: CPT | Mod: 25

## 2021-07-20 PROCEDURE — 11740 EVACUATION SUBUNGUAL HMTMA: CPT | Mod: F7

## 2021-07-20 PROCEDURE — 73140 X-RAY EXAM OF FINGER(S): CPT | Mod: RT

## 2021-07-20 ASSESSMENT — ENCOUNTER SYMPTOMS: WOUND: 1

## 2021-07-21 NOTE — ED TRIAGE NOTES
Pt aox4, ABCs intact. Pt slammed his middle right finger in the car door this AM. Finger is bruised. Decreased CMS in the finger. Pain 8/10

## 2021-07-21 NOTE — ED PROVIDER NOTES
History   Chief Complaint:  Hand Injury     The history is provided by the patient.      Sascha Pacheco is a left handed 45 year old male with history of Hodgkin's disease, hyperlipidemia and hypertension who presents with pain to the tip of his right middle finger after he slammed it in a car door 15 hours ago. He states that the pain has worsened throughout the day and notes increased swelling from this morning as well as bleeding under the fingernail. The patient reports taking ibuprofen this morning for his symptoms and applied ice for additional at home treatment. He additionally stated that he took Aspirin later this afternoon.     Review of Systems   Skin: Positive for wound (and swelling).   All other systems reviewed and are negative.         Allergies:  No Known Drug Allergies    Medications:  atorvastatin  cetrizine  Fluticasone  Latanoprost  Lisinopril    Past Medical History:    Glaucoma  Hyperlipidemia  Hodgkin's disease  Recurrent depression/anxiety  Steatosis of liver  Osteoarthritis of glenohumeral joint  Benign hypertension    Past Surgical History:    Arthroscopy shoulder decompression  Appendectomy  Knee arthroscopy  Vasectomy  Resection Hodgkins, salivary glands, node dissection    Family History:    Cancer, melanoma  Gallbladder disease  CAD    Social History:  The patient presented to the emergency department alone today.  The patient is not a substance or tobacco user.    Physical Exam     Patient Vitals for the past 24 hrs:   BP Temp Temp src Pulse Resp SpO2   07/20/21 2134 (!) 162/97 97  F (36.1  C) Temporal 58 20 97 %       Physical Exam  General: the patient is awake and interactive  HEENT:  Moist mucous membranes, conjunctiva normal  Pulmonary:  Normal respiratory effort  Cardiovascular:  Well perfused  Musculoskeletal:  Moving 4 extremities grossly wnl, no deformities  Right hand:  Subungual hematoma of 3rd finger covering 100% of nail.  Nail plate intact.  Eponychium and nail  folds intact.  Bruising noted to tuft of 3rd finger with tenderness to the distal phalanx. Able to flex/extend all digits against resistance; sensation intact to light touch to median/ulnar/radial nerve distributions  Neuro:  Speech normal, no focal deficits  Psych:  Normal affect    Emergency Department Course     Imaging:    XR Finger Right G/E 2 Views:  There is a minimally displaced fracture through the tuft of the distal phalanx of the middle finger with overlying soft tissue swelling. No dislocation.   As per radiology.     Procedures  PROCEDURE:  Nail trephination.   INDICATION:  Subungal hematoma.   CONSENT:  The risks and benefits of the procedure were discussed with the patient.  TECHNIQUE:  The right middle fingernail was cleaned with an alcohol swab.  An cautery device was used to trephinate the nail plate.  A satisfactory amount of blood was expressed.  Pain was improved following the procedure.    Emergency Department Course:    Reviewed:  I reviewed nursing notes, vitals, past medical history and care everywhere    Assessments:  2226 I obtained history and examined the patient as noted above.   2238 I performed the nail trephination at this time. At this point I feel that the patient is safe for discharge, and the patient agrees.     Disposition:  The patient was discharged to home.     Impression & Plan     Medical Decision Making:  Sascha Pacheco is a 45 year old LHD male who presents for evaluation of finger pain and bleeding under the right middle fingernail after accidentally closing a car door on his hand. This is consistent with subungual hematoma, drained here in ED with trephination.  Xray of the finger done and shows minimally displaced fracture through the tuft of the distal phalanx fracture.  No signs of tendon or neurovascular injury.  Would not remove whole nail given intact nail plate and no other nail injury.  Supportive outpatient management indicated.  Recommend  Tylenol/ibuprofen, ICE, finger splint for comfort.  No indication for antibiotics at this time. Follow up with PCP in 1 week to ensure continued healing.  The patient understood the plan and was discharged home in good condition. All questions answered.    Diagnosis:    ICD-10-CM    1. Subungual hematoma of right index finger  S60.121A    2. Closed fracture of tuft of distal phalanx of finger  S62.639A          Scribe Disclosure:  I, LYNN PEREZ, am serving as a scribe at 10:25 PM on 7/20/2021 to document services personally performed by Vishal Smith MD based on my observations and the provider's statements to me.     I, Jarad Casillas, am serving as a scribe on 7/20/2021 at 11:41 PM to personally document services performed by Vishal Smith MD based on my observations and the provider's statements to me.           Vishal Smith MD  07/21/21 0150

## 2021-09-19 ENCOUNTER — HEALTH MAINTENANCE LETTER (OUTPATIENT)
Age: 46
End: 2021-09-19

## 2021-10-24 ENCOUNTER — OFFICE VISIT (OUTPATIENT)
Dept: URGENT CARE | Facility: URGENT CARE | Age: 46
End: 2021-10-24
Payer: COMMERCIAL

## 2021-10-24 VITALS
WEIGHT: 209.6 LBS | BODY MASS INDEX: 31.18 KG/M2 | SYSTOLIC BLOOD PRESSURE: 140 MMHG | RESPIRATION RATE: 18 BRPM | TEMPERATURE: 99 F | HEART RATE: 72 BPM | OXYGEN SATURATION: 99 % | DIASTOLIC BLOOD PRESSURE: 84 MMHG

## 2021-10-24 DIAGNOSIS — B02.9 HERPES ZOSTER WITHOUT COMPLICATION: Primary | ICD-10-CM

## 2021-10-24 DIAGNOSIS — M54.50 ACUTE RIGHT-SIDED LOW BACK PAIN WITHOUT SCIATICA: ICD-10-CM

## 2021-10-24 PROCEDURE — 99214 OFFICE O/P EST MOD 30 MIN: CPT | Performed by: PHYSICIAN ASSISTANT

## 2021-10-24 RX ORDER — CYCLOBENZAPRINE HCL 10 MG
10 TABLET ORAL 2 TIMES DAILY PRN
Qty: 20 TABLET | Refills: 0 | Status: SHIPPED | OUTPATIENT
Start: 2021-10-24 | End: 2021-11-03

## 2021-10-24 RX ORDER — PREDNISONE 20 MG/1
TABLET ORAL
Qty: 20 TABLET | Refills: 0 | Status: SHIPPED | OUTPATIENT
Start: 2021-10-24 | End: 2022-10-21

## 2021-10-24 RX ORDER — VALACYCLOVIR HYDROCHLORIDE 1 G/1
1000 TABLET, FILM COATED ORAL 3 TIMES DAILY
Qty: 21 TABLET | Refills: 0 | Status: SHIPPED | OUTPATIENT
Start: 2021-10-24 | End: 2022-10-21

## 2021-10-24 NOTE — PATIENT INSTRUCTIONS
Patient Education     Shingles  Shingles is a viral infection caused by the same virus that causes chicken pox. Anyone who has had chicken pox may get shingles later in life. The virus stays in the body, but remains asleep (dormant). Shingles often occurs in older persons or persons with lowered immunity. But it can affect anyone at any age.  Shingles starts as a tingling patch of skin on one side of the body. Small, painful blisters may then appear. The rash rarely spreads to other parts of the body.  Exposure to shingles can't cause shingles. However, it can cause chicken pox in anyone who has not had chicken pox or has not been vaccinated. The contagious period ends when all blisters have crusted over, generally 1 to 2 weeks after the illness starts.  After the blisters heal, the affected skin may be sensitive or painful for weeks or months, gradually resolving over time. But, sometimes this can last longer and be permanent (called postherpetic neuralgia.)  Shingles vaccines are available. Vaccination can help prevent shingles or make it less painful. It is generally recommended for adults older than 50, even if you've had singles in the past. Talk with your healthcare provider about when to get vaccinated and which vaccine is best for you.  Home care    Medicines may be prescribed to help relieve pain. Take these medicines as directed. Ask your healthcare provider or pharmacist before using over-the-counter medicines for helping treat pain and itching.    In certain cases, antiviral medicines may be prescribed to reduce pain, shorten the illness, and prevent neuralgia. Take these medicines as directed.    Compresses made from a solution of cool water mixed with cornstarch or baking soda may help relieve pain and itching.     Gently wash skin daily with soap and water to help prevent infection. Be certain to rinse off all of the soap, which can be irritating.    Trim fingernails and try not to scratch.  Scratching the sores may leave scars.    Stay home from work or school until all blisters have formed a crust and you are no longer contagious.  Follow-up care  Follow up with your healthcare provider, or as directed.  When to seek medical advice    Fever of 100.4 F (38 C) or higher, or as directed by your healthcare provider    Affected skin is on the face or neck and any of the following occur:  ? Headache  ? Eye pain  ? Changes in vision  ? Sores near the eye  ? Weakness of facial muscles    Blisters occurring on new areas of the body    Pain, redness, or swelling of a joint    Signs of skin infection: colored drainage from the sores, warmth, increasing redness, fever, or increasing pain  [employer] last reviewed this educational content on 4/1/2018 2000-2021 [employer]. All rights reserved. This information is not intended as a substitute for professional medical care. Always follow your healthcare professional's instructions.           Patient Education     Relieving Back Pain  Back pain is a common problem. You can strain back muscles by lifting too much weight or just by moving the wrong way. Back strain can be uncomfortable, even painful. And it can take weeks or months to improve. To help yourself feel better and prevent future back strains, try these tips.  Important: Don't give aspirin to children or teens without first discussing it with your child's healthcare provider.  Ice    Ice reduces muscle pain and swelling. It helps most during the first 24 to 48 hours after an injury.    Wrap an ice pack or a bag of frozen peas in a thin towel. Never put ice directly on your skin.    Place the ice where your back hurts the most.    Don t ice for more than 20 minutes at a time.    You can use ice several times a day.  Medicines  Over-the-counter pain relievers include acetaminophen and anti-inflammatory medicines, which includes aspirin, naproxen, or ibuprofen. They can help ease discomfort. Some  also reduce swelling.    Tell your healthcare provider about any medicines you are already taking.    Take medicines only as directed.  Manipulation and massage  Having manipulation by an osteopathic doctor or chiropractor may be helpful. Getting a massage also may help.   Heat  After the first 48 hours, heat can relax sore muscles and improve blood flow.    Try a warm bath or shower. Or use a heating pad set on low. To prevent a burn, keep a cloth between you and the heating pad.    Don t use a heating pad for more than 15 minutes at a time. Never sleep on a heating pad.  [employer] last reviewed this educational content on 6/1/2018 2000-2021 [employer]. All rights reserved. This information is not intended as a substitute for professional medical care. Always follow your healthcare professional's instructions.

## 2021-10-24 NOTE — PROGRESS NOTES
Herpes zoster without complication  - predniSONE (DELTASONE) 20 MG tablet; Take 3 tabs by mouth daily x 3 days, then 2 tabs daily x 3 days, then 1 tab daily x 3 days, then 1/2 tab daily x 3 days.  - valACYclovir (VALTREX) 1000 mg tablet; Take 1 tablet (1,000 mg) by mouth 3 times daily for 7 days    Acute right-sided low back pain without sciatica  - predniSONE (DELTASONE) 20 MG tablet; Take 3 tabs by mouth daily x 3 days, then 2 tabs daily x 3 days, then 1 tab daily x 3 days, then 1/2 tab daily x 3 days.  - cyclobenzaprine (FLEXERIL) 10 MG tablet; Take 1 tablet (10 mg) by mouth 2 times daily as needed for muscle spasms  - Spine Referral; Future    30 minutes spent on the date of the encounter doing chart review, history and exam, documentation and further activities per the note     See Patient Instructions  Patient Instructions     Patient Education     Shingles  Shingles is a viral infection caused by the same virus that causes chicken pox. Anyone who has had chicken pox may get shingles later in life. The virus stays in the body, but remains asleep (dormant). Shingles often occurs in older persons or persons with lowered immunity. But it can affect anyone at any age.  Shingles starts as a tingling patch of skin on one side of the body. Small, painful blisters may then appear. The rash rarely spreads to other parts of the body.  Exposure to shingles can't cause shingles. However, it can cause chicken pox in anyone who has not had chicken pox or has not been vaccinated. The contagious period ends when all blisters have crusted over, generally 1 to 2 weeks after the illness starts.  After the blisters heal, the affected skin may be sensitive or painful for weeks or months, gradually resolving over time. But, sometimes this can last longer and be permanent (called postherpetic neuralgia.)  Shingles vaccines are available. Vaccination can help prevent shingles or make it less painful. It is generally recommended for  adults older than 50, even if you've had singles in the past. Talk with your healthcare provider about when to get vaccinated and which vaccine is best for you.  Home care    Medicines may be prescribed to help relieve pain. Take these medicines as directed. Ask your healthcare provider or pharmacist before using over-the-counter medicines for helping treat pain and itching.    In certain cases, antiviral medicines may be prescribed to reduce pain, shorten the illness, and prevent neuralgia. Take these medicines as directed.    Compresses made from a solution of cool water mixed with cornstarch or baking soda may help relieve pain and itching.     Gently wash skin daily with soap and water to help prevent infection. Be certain to rinse off all of the soap, which can be irritating.    Trim fingernails and try not to scratch. Scratching the sores may leave scars.    Stay home from work or school until all blisters have formed a crust and you are no longer contagious.  Follow-up care  Follow up with your healthcare provider, or as directed.  When to seek medical advice    Fever of 100.4 F (38 C) or higher, or as directed by your healthcare provider    Affected skin is on the face or neck and any of the following occur:  ? Headache  ? Eye pain  ? Changes in vision  ? Sores near the eye  ? Weakness of facial muscles    Blisters occurring on new areas of the body    Pain, redness, or swelling of a joint    Signs of skin infection: colored drainage from the sores, warmth, increasing redness, fever, or increasing pain  [employer] last reviewed this educational content on 4/1/2018 2000-2021 [employer]. All rights reserved. This information is not intended as a substitute for professional medical care. Always follow your healthcare professional's instructions.           Patient Education     Relieving Back Pain  Back pain is a common problem. You can strain back muscles by lifting too much weight or just by  moving the wrong way. Back strain can be uncomfortable, even painful. And it can take weeks or months to improve. To help yourself feel better and prevent future back strains, try these tips.  Important: Don't give aspirin to children or teens without first discussing it with your child's healthcare provider.  Ice    Ice reduces muscle pain and swelling. It helps most during the first 24 to 48 hours after an injury.    Wrap an ice pack or a bag of frozen peas in a thin towel. Never put ice directly on your skin.    Place the ice where your back hurts the most.    Don t ice for more than 20 minutes at a time.    You can use ice several times a day.  Medicines  Over-the-counter pain relievers include acetaminophen and anti-inflammatory medicines, which includes aspirin, naproxen, or ibuprofen. They can help ease discomfort. Some also reduce swelling.    Tell your healthcare provider about any medicines you are already taking.    Take medicines only as directed.  Manipulation and massage  Having manipulation by an osteopathic doctor or chiropractor may be helpful. Getting a massage also may help.   Heat  After the first 48 hours, heat can relax sore muscles and improve blood flow.    Try a warm bath or shower. Or use a heating pad set on low. To prevent a burn, keep a cloth between you and the heating pad.    Don t use a heating pad for more than 15 minutes at a time. Never sleep on a heating pad.  [employer] last reviewed this educational content on 6/1/2018 2000-2021 [employer]. All rights reserved. This information is not intended as a substitute for professional medical care. Always follow your healthcare professional's instructions.               German Dao PA-C  [facility]    Subjective   45 year old who presents to clinic today for the following health issues:    Back Pain and Rash       HPI     Back Pain  Onset/Duration: June or July but has been gradually worsening.  Last week has been the worse.  Description:   Location of pain: low back right  Character of pain: dull ache with occasional sharp shooting pains  Pain radiation: Radiates to bilateral hips and pelvis.   New numbness or weakness in legs, not attributed to pain: no   Intensity: moderate to severe  Progression of Symptoms: worsening  History:   Specific cause: none known  Pain interferes with job: YES  History of back problems: recurrent self limited episodes of low back pain in the past  Any previous MRI or X-rays: None  Sees a specialist for back pain: No, but patient has been seen by PT.   Alleviating factors:   Improved by: acetaminophen (Tylenol), cold, heat and rest    Precipitating factors:  Worsened by: Leaning forward, leaning backwards, twisting.     Review of Systems   Review of Systems   See HPI     Objective    Temp: 99  F (37.2  C) Temp src: Oral BP: (!) 140/84 Pulse: 72   Resp: 18 SpO2: 99 %       Physical Exam   Physical Exam  Constitutional:       General: He is not in acute distress.     Appearance: Normal appearance. He is normal weight. He is not ill-appearing, toxic-appearing or diaphoretic.   Cardiovascular:      Rate and Rhythm: Normal rate and regular rhythm.      Pulses: Normal pulses.      Heart sounds: No murmur heard.   No friction rub. No gallop.    Pulmonary:      Effort: Pulmonary effort is normal. No respiratory distress.      Breath sounds: Normal breath sounds. No stridor. No wheezing, rhonchi or rales.   Chest:      Chest wall: No tenderness.   Musculoskeletal:      Lumbar back: Tenderness present. No swelling, edema, deformity or bony tenderness. Negative right straight leg raise test and negative left straight leg raise test. No scoliosis.        Back:    Skin:     General: Skin is cool and dry.      Findings: Erythema and rash present. Rash is vesicular.          Neurological:      General: No focal deficit present.      Mental Status: He is alert and oriented to person, place,  and time. Mental status is at baseline.      Gait: Gait normal.   Psychiatric:         Mood and Affect: Mood normal.         Behavior: Behavior normal.         Thought Content: Thought content normal.         Judgment: Judgment normal.          No results found for this or any previous visit (from the past 24 hour(s)).

## 2022-03-11 ENCOUNTER — TRANSFERRED RECORDS (OUTPATIENT)
Dept: HEALTH INFORMATION MANAGEMENT | Facility: CLINIC | Age: 47
End: 2022-03-11
Payer: COMMERCIAL

## 2022-03-11 LAB
ALT SERPL-CCNC: 63 U/L (ref 9–46)
AST SERPL-CCNC: 40 U/L (ref 10–40)
CHOLESTEROL (EXTERNAL): 187 MG/DL
CREATININE (EXTERNAL): 0.95 MG/DL (ref 0.6–1.35)
GFR ESTIMATED (EXTERNAL): 95 ML/MIN/1.73M2
GFR ESTIMATED (IF AFRICAN AMERICAN) (EXTERNAL): 110 ML/MIN/1.73M2
GLUCOSE (EXTERNAL): 103 MG/DL (ref 65–99)
HDLC SERPL-MCNC: 39 MG/DL
LDL CHOLESTEROL (EXTERNAL): 121 MG/DL
NON HDL CHOLESTEROL (EXTERNAL): 148 MG/DL
POTASSIUM (EXTERNAL): 4.4 MMOL/L (ref 3.5–5.3)
TRIGLYCERIDES (EXTERNAL): 152 MG/DL

## 2022-05-01 ENCOUNTER — HEALTH MAINTENANCE LETTER (OUTPATIENT)
Age: 47
End: 2022-05-01

## 2022-05-27 DIAGNOSIS — I10 BENIGN HYPERTENSION: ICD-10-CM

## 2022-05-31 RX ORDER — LISINOPRIL 10 MG/1
10 TABLET ORAL DAILY
Qty: 30 TABLET | Refills: 0 | Status: SHIPPED | OUTPATIENT
Start: 2022-05-31 | End: 2022-06-02

## 2022-05-31 NOTE — TELEPHONE ENCOUNTER
"S: refill Lisinopril     B: Wife calls. Patient has been out of Lisinopril for one week.  States patient was seen by Ag Garcia MD on 02/04/22 at 0830 at [facility] and again for a nurse only appointment appointment on 02/07/22 for a blood pressure recheck.    A:  BP Readings from Last 1 Encounters:   10/24/21 (!) 140/84      Review of chart and provider's schedule does not show patient having an appointment on either date.    R: Patient needs to schedule medication check appointment.    Wife states she will fax results of patient's \"insurance physical\"    Christine Duarte RN    "

## 2022-06-02 ENCOUNTER — OFFICE VISIT (OUTPATIENT)
Dept: FAMILY MEDICINE | Facility: CLINIC | Age: 47
End: 2022-06-02
Payer: COMMERCIAL

## 2022-06-02 VITALS
WEIGHT: 209 LBS | OXYGEN SATURATION: 96 % | BODY MASS INDEX: 30.96 KG/M2 | HEART RATE: 70 BPM | RESPIRATION RATE: 16 BRPM | DIASTOLIC BLOOD PRESSURE: 92 MMHG | HEIGHT: 69 IN | SYSTOLIC BLOOD PRESSURE: 152 MMHG | TEMPERATURE: 98.4 F

## 2022-06-02 DIAGNOSIS — R05.9 COUGH: ICD-10-CM

## 2022-06-02 DIAGNOSIS — J30.9 ALLERGIC RHINITIS, UNSPECIFIED SEASONALITY, UNSPECIFIED TRIGGER: ICD-10-CM

## 2022-06-02 DIAGNOSIS — G89.29 CHRONIC BILATERAL LOW BACK PAIN WITH BILATERAL SCIATICA: ICD-10-CM

## 2022-06-02 DIAGNOSIS — I10 BENIGN HYPERTENSION: Primary | ICD-10-CM

## 2022-06-02 DIAGNOSIS — M54.42 CHRONIC BILATERAL LOW BACK PAIN WITH BILATERAL SCIATICA: ICD-10-CM

## 2022-06-02 DIAGNOSIS — M54.41 CHRONIC BILATERAL LOW BACK PAIN WITH BILATERAL SCIATICA: ICD-10-CM

## 2022-06-02 DIAGNOSIS — Z12.11 COLON CANCER SCREENING: ICD-10-CM

## 2022-06-02 DIAGNOSIS — E78.5 HYPERLIPIDEMIA LDL GOAL <160: ICD-10-CM

## 2022-06-02 PROBLEM — M51.9 INTERVERTEBRAL DISK DISEASE: Status: ACTIVE | Noted: 2022-06-02

## 2022-06-02 PROCEDURE — 99214 OFFICE O/P EST MOD 30 MIN: CPT | Performed by: PHYSICIAN ASSISTANT

## 2022-06-02 RX ORDER — AZITHROMYCIN 250 MG/1
TABLET, FILM COATED ORAL
Qty: 6 TABLET | Refills: 0 | Status: SHIPPED | OUTPATIENT
Start: 2022-06-02 | End: 2022-06-07

## 2022-06-02 RX ORDER — LISINOPRIL 20 MG/1
20 TABLET ORAL DAILY
Qty: 90 TABLET | Refills: 1 | Status: SHIPPED | OUTPATIENT
Start: 2022-06-02 | End: 2022-12-12

## 2022-06-02 RX ORDER — ATORVASTATIN CALCIUM 20 MG/1
20 TABLET, FILM COATED ORAL DAILY
Qty: 90 TABLET | Refills: 3 | Status: SHIPPED | OUTPATIENT
Start: 2022-06-02 | End: 2023-03-10

## 2022-06-02 RX ORDER — FLUTICASONE PROPIONATE 50 MCG
1-2 SPRAY, SUSPENSION (ML) NASAL DAILY
Qty: 16 G | Refills: 8 | Status: SHIPPED | OUTPATIENT
Start: 2022-06-02 | End: 2023-03-15

## 2022-06-02 NOTE — PROGRESS NOTES
Assessment & Plan     Benign hypertension  Not at goal. Increased lisinopril to 20 mg daily.  Recheck in 1-2 weeks.  Recheck BMP at that time.  I reviewed the outside labs which included lipid and CMP today. Copies obtained to place in chart.  - lisinopril (ZESTRIL) 20 MG tablet; Take 1 tablet (20 mg) by mouth daily  - Basic metabolic panel  (Ca, Cl, CO2, Creat, Gluc, K, Na, BUN); Future    Hyperlipidemia LDL goal <160  Refilled.  - atorvastatin (LIPITOR) 20 MG tablet; Take 1 tablet (20 mg) by mouth daily    Allergic rhinitis, unspecified seasonality, unspecified trigger  Refilled Flonase.  Started on Z-pack due to recent symptoms with sputum production.  - fluticasone (FLONASE) 50 MCG/ACT nasal spray; Spray 1-2 sprays into both nostrils daily  - azithromycin (ZITHROMAX) 250 MG tablet; Take 2 tablets (500 mg) by mouth daily for 1 day, THEN 1 tablet (250 mg) daily for 4 days.    Chronic bilateral low back pain with bilateral sciatica  Seeing [facility]. Treated with Medrol dose pack and Naproxen several months ago.    Cough  As noted above.  - azithromycin (ZITHROMAX) 250 MG tablet; Take 2 tablets (500 mg) by mouth daily for 1 day, THEN 1 tablet (250 mg) daily for 4 days.    Colon cancer screening  He had discussed ColoGuard with provider at his physical (at an outside facility). Still considering options.      Review of external notes as documented elsewhere in note  Ordering of each unique test  Prescription drug management      Return in about 2 weeks (around 6/16/2022) for Lab Work, nurse only BP.    IVONNE Jones [facility]    Florentino Caicedo is a 46 year old who presents for the following health issues     History of Present Illness       Hypertension: He presents for follow up of hypertension.  He does check blood pressure  regularly outside of the clinic. Outpatient blood pressures have not been over 140/90. He does not follow a low salt diet.       He notes he has had some  "allergy like symptoms. He is coughing up some brown colored sputum. He notes this has occurred for him before. He notes Z-pack is usually helpful.  He has a history of Hodgkin's lymphoma had has history of radiation treatment. This has led him to be more sensitive to illnesses.     Of note patient had annual physical in March at an outside facility.    Review of Systems   GENERAL:  No fever  RESP:  No shortness of breath  CARDIAC: No chest pain  MUSCULOSKELETAL: As noted in HPI          Objective    BP (!) 146/83 (BP Location: Right arm, Patient Position: Sitting, Cuff Size: Adult Large)   Pulse 70   Temp 98.4  F (36.9  C) (Oral)   Resp 16   Ht 1.753 m (5' 9\")   Wt 94.8 kg (209 lb)   SpO2 96%   BMI 30.86 kg/m    Body mass index is 30.86 kg/m .  Physical Exam   GENERAL: No acute distress  HEENT: Normocephalic, PERRL  CARDIAC: Regular rate and rhythm. No murmurs.  PULMONARY: Lungs are clear to auscultation bilaterally. No wheezes, rhonchi or crackles.  NEURO: Alert and non-focal          "

## 2022-06-29 ENCOUNTER — LAB (OUTPATIENT)
Dept: LAB | Facility: CLINIC | Age: 47
End: 2022-06-29
Payer: COMMERCIAL

## 2022-06-29 DIAGNOSIS — I10 BENIGN HYPERTENSION: ICD-10-CM

## 2022-06-29 PROCEDURE — 80048 BASIC METABOLIC PNL TOTAL CA: CPT

## 2022-06-29 PROCEDURE — 36415 COLL VENOUS BLD VENIPUNCTURE: CPT

## 2022-07-01 LAB
ANION GAP SERPL CALCULATED.3IONS-SCNC: 6 MMOL/L (ref 3–14)
BUN SERPL-MCNC: 19 MG/DL (ref 7–30)
CALCIUM SERPL-MCNC: 8.7 MG/DL (ref 8.5–10.1)
CHLORIDE BLD-SCNC: 109 MMOL/L (ref 94–109)
CO2 SERPL-SCNC: 26 MMOL/L (ref 20–32)
CREAT SERPL-MCNC: 0.96 MG/DL (ref 0.66–1.25)
GFR SERPL CREATININE-BSD FRML MDRD: >90 ML/MIN/1.73M2
GLUCOSE BLD-MCNC: 96 MG/DL (ref 70–99)
POTASSIUM BLD-SCNC: 3.9 MMOL/L (ref 3.4–5.3)
SODIUM SERPL-SCNC: 141 MMOL/L (ref 133–144)

## 2022-10-21 ENCOUNTER — OFFICE VISIT (OUTPATIENT)
Dept: FAMILY MEDICINE | Facility: CLINIC | Age: 47
End: 2022-10-21
Payer: COMMERCIAL

## 2022-10-21 VITALS
SYSTOLIC BLOOD PRESSURE: 118 MMHG | HEIGHT: 69 IN | HEART RATE: 72 BPM | BODY MASS INDEX: 32.73 KG/M2 | TEMPERATURE: 98.1 F | WEIGHT: 221 LBS | OXYGEN SATURATION: 98 % | DIASTOLIC BLOOD PRESSURE: 72 MMHG

## 2022-10-21 DIAGNOSIS — I10 BENIGN HYPERTENSION: ICD-10-CM

## 2022-10-21 DIAGNOSIS — G89.29 CHRONIC BILATERAL LOW BACK PAIN WITH BILATERAL SCIATICA: ICD-10-CM

## 2022-10-21 DIAGNOSIS — M54.42 CHRONIC BILATERAL LOW BACK PAIN WITH BILATERAL SCIATICA: ICD-10-CM

## 2022-10-21 DIAGNOSIS — Z23 NEED FOR INFLUENZA VACCINATION: ICD-10-CM

## 2022-10-21 DIAGNOSIS — M54.41 CHRONIC BILATERAL LOW BACK PAIN WITH BILATERAL SCIATICA: ICD-10-CM

## 2022-10-21 DIAGNOSIS — Z01.818 PREOP GENERAL PHYSICAL EXAM: Primary | ICD-10-CM

## 2022-10-21 DIAGNOSIS — R06.83 SNORING: ICD-10-CM

## 2022-10-21 LAB
ANION GAP SERPL CALCULATED.3IONS-SCNC: 5 MMOL/L (ref 3–14)
BUN SERPL-MCNC: 21 MG/DL (ref 7–30)
CALCIUM SERPL-MCNC: 9.4 MG/DL (ref 8.5–10.1)
CHLORIDE BLD-SCNC: 108 MMOL/L (ref 94–109)
CO2 SERPL-SCNC: 27 MMOL/L (ref 20–32)
CREAT SERPL-MCNC: 0.87 MG/DL (ref 0.66–1.25)
ERYTHROCYTE [DISTWIDTH] IN BLOOD BY AUTOMATED COUNT: 13 % (ref 10–15)
GFR SERPL CREATININE-BSD FRML MDRD: >90 ML/MIN/1.73M2
GLUCOSE BLD-MCNC: 104 MG/DL (ref 70–99)
HCT VFR BLD AUTO: 44 % (ref 40–53)
HGB BLD-MCNC: 15.2 G/DL (ref 13.3–17.7)
MCH RBC QN AUTO: 30.5 PG (ref 26.5–33)
MCHC RBC AUTO-ENTMCNC: 34.5 G/DL (ref 31.5–36.5)
MCV RBC AUTO: 88 FL (ref 78–100)
PLATELET # BLD AUTO: 209 10E3/UL (ref 150–450)
POTASSIUM BLD-SCNC: 4.4 MMOL/L (ref 3.4–5.3)
RBC # BLD AUTO: 4.98 10E6/UL (ref 4.4–5.9)
SODIUM SERPL-SCNC: 140 MMOL/L (ref 133–144)
WBC # BLD AUTO: 5.9 10E3/UL (ref 4–11)

## 2022-10-21 PROCEDURE — 99214 OFFICE O/P EST MOD 30 MIN: CPT | Mod: 25 | Performed by: PHYSICIAN ASSISTANT

## 2022-10-21 PROCEDURE — 80048 BASIC METABOLIC PNL TOTAL CA: CPT | Performed by: PHYSICIAN ASSISTANT

## 2022-10-21 PROCEDURE — 85027 COMPLETE CBC AUTOMATED: CPT | Performed by: PHYSICIAN ASSISTANT

## 2022-10-21 PROCEDURE — 36415 COLL VENOUS BLD VENIPUNCTURE: CPT | Performed by: PHYSICIAN ASSISTANT

## 2022-10-21 PROCEDURE — 90471 IMMUNIZATION ADMIN: CPT | Performed by: PHYSICIAN ASSISTANT

## 2022-10-21 PROCEDURE — 90686 IIV4 VACC NO PRSV 0.5 ML IM: CPT | Performed by: PHYSICIAN ASSISTANT

## 2022-10-21 NOTE — PROGRESS NOTES
[address] [address]  Phone: [phone]  Primary Provider: Rob Coronado  Pre-op Performing Provider: KEYANNA FRASER      PREOPERATIVE EVALUATION:  Today's date: 10/21/2022    Sascha Pacheco is a 46 year old male who presents for a preoperative evaluation.    Surgical Information:  Surgery/Procedure: Microdiscetomy - L3-4  Surgery Location: [facility]  Surgeon: Dr. Castillo  Surgery Date: 10/31/2022  Time of Surgery: AM  Where patient plans to recover: At home with family  Fax number for surgical facility: [phone]    Type of Anesthesia Anticipated: General    Assessment & Plan     The proposed surgical procedure is considered INTERMEDIATE risk.    Sascha was seen today for pre-op exam.    Diagnoses and all orders for this visit:    Preop general physical exam  Chronic bilateral low back pain with bilateral sciatica  -     45 yo male pursuing surgery for recalcitrant back pain despite conservative measures.  - Basic metabolic panel  (Ca, Cl, CO2, Creat, Gluc, K, Na, BUN); Future  -     CBC with platelets; Future  -     Basic metabolic panel  (Ca, Cl, CO2, Creat, Gluc, K, Na, BUN)  -     CBC with platelets    Benign hypertension   - Well controlled on current regimen.    Snoring   - Denies any other features including witnessed apnea, daytime fatigue. Discussed sleep med consult to rule out BRENNA, but he will declines and will consider post-operatively.    Need for influenza vaccination  -     INFLUENZA VACCINE IM > 6 MONTHS VALENT IIV4 (AFLURIA/FLUZONE)         Risks and Recommendations:  The patient has the following additional risks and recommendations for perioperative complications:  Possibility of obstructive Sleep Apnea:  Please monitor for perioperative sleep disordered breathing    Medication Instructions:   - ACE/ARB: May be continued on the day of surgery.    - Statins: Continue taking on the day of surgery.      RECOMMENDATION:  APPROVAL GIVEN to proceed with proposed procedure, without further diagnostic evaluation.          Subjective     HPI related to upcoming procedure: suffering with back pain x1 yr and went to  where reports was told he had arthritis. Over winter suffered with ongoing flare-ups that didn't resolve so sought speciality care, did PT, had MRI and reports was found to have 2 herniations L3-4, L4-5. Underwent JOSSY which didn't provide meaningful relief so no pursuing surgery.     Preop Questions 10/21/2022   1. Have you ever had a heart attack or stroke? No   2. Have you ever had surgery on your heart or blood vessels, such as a stent placement, a coronary artery bypass, or surgery on an artery in your head, neck, heart, or legs? No   3. Do you have chest pain with activity? No   4. Do you have a history of  heart failure? No   5. Do you currently have a cold, bronchitis or symptoms of other infection? No   6. Do you have a cough, shortness of breath, or wheezing? No   7. Do you or anyone in your family have previous history of blood clots? No   8. Do you or does anyone in your family have a serious bleeding problem such as prolonged bleeding following surgeries or cuts? No   9. Have you ever had problems with anemia or been told to take iron pills? No   10. Have you had any abnormal blood loss such as black, tarry or bloody stools? No   11. Have you ever had a blood transfusion? No   12. Are you willing to have a blood transfusion if it is medically needed before, during, or after your surgery? Yes   13. Have you or any of your relatives ever had problems with anesthesia? No   14. Do you have sleep apnea, excessive snoring or daytime drowsiness? No   15. Do you have any artifical heart valves or other implanted medical devices like a pacemaker, defibrillator, or continuous glucose monitor? No   16. Do you have artificial joints? No   17. Are you allergic to latex? No       Health Care  Directive:  Patient does not have a Health Care Directive or Living Will: Discussed advance care planning with patient; however, patient declined at this time.    Preoperative Review of :   reviewed - no record of controlled substances prescribed.      Status of Chronic Conditions:  HYPERLIPIDEMIA - Patient has a long history of significant Hyperlipidemia requiring medication for treatment with recent fair control. Patient reports no problems or side effects with the medication.     HYPERTENSION - Patient has longstanding history of HTN , currently denies any symptoms referable to elevated blood pressure. Specifically denies chest pain, palpitations, dyspnea, orthopnea, PND or peripheral edema. Blood pressure readings have been in normal range. Current medication regimen is as listed below. Patient denies any side effects of medication.       Review of Systems  CONSTITUTIONAL: NEGATIVE for fever, chills, change in weight  INTEGUMENTARY/SKIN: NEGATIVE for worrisome rashes, moles or lesions  EYES: NEGATIVE for vision changes or irritation  ENT/MOUTH: NEGATIVE for ear, mouth and throat problems  RESP: NEGATIVE for significant cough or SOB  CV: NEGATIVE for chest pain, palpitations or peripheral edema  GI: NEGATIVE for nausea, abdominal pain, heartburn, or change in bowel habits  : NEGATIVE for frequency, dysuria, or hematuria  MUSCULOSKELETAL: NEGATIVE for significant arthralgias or myalgia  NEURO: NEGATIVE for weakness, dizziness or paresthesias  ENDOCRINE: NEGATIVE for temperature intolerance, skin/hair changes  HEME: NEGATIVE for bleeding problems  PSYCHIATRIC: NEGATIVE for changes in mood or affect    Patient Active Problem List    Diagnosis Date Noted     Intervertebral disk disease 06/02/2022     Priority: Medium     Seeing [facility]       Chronic bilateral low back pain with bilateral sciatica 06/02/2022     Priority: Medium     Seeing [facility]       Steatosis of liver 04/03/2019     Priority: Medium      "Osteoarthritis of glenohumeral joint 01/17/2013     Priority: Medium     Hx of shoulder surgery 01/17/2013     Priority: Medium     Benign hypertension 02/13/2012     Priority: Medium     Personal history of radiation therapy 02/03/2012     Priority: Medium     Shoulder pain 12/29/2011     Priority: Medium     Arthropathy of multiple sites 12/18/2008     Priority: Medium     Glaucoma 12/05/2008     Priority: Medium     Followed by ophthalmologist for \"pigmentary dispersement glaucoma\"       Hyperlipidemia LDL goal <160 12/01/2004     Priority: Medium     [place] 10-year CHD Risk Score: 2% (6 Total Points)   Values used to calculate score:     Age: 34 years -- Points: -4     Total Cholesterol: 238 mg/dL -- Points: 7     HDL Cholesterol: 30 mg/dL -- Points: 2     Systolic BP (untreated): 132 mmHg -- Points: 1         History of Hodgkin's disease 12/17/2003     Priority: Medium     Problem list name updated by automated process. Provider to review        Past Medical History:   Diagnosis Date     Glaucoma      Other and unspecified hyperlipidemia      Personal history of Hodgkin's disease 2000    radiation rx, stage 1A, neck mass     Recurrent Depression/Anxiety 4/7/2005     Recurrent Depression/Anxiety, Full Remission 4/7/2005     Typical Chest Pain, Related to Anxiety 8/2004    negative workup including EKG and stress echo      Past Surgical History:   Procedure Laterality Date     ARTHROSCOPY SHOULDER DECOMPRESSION  12/15/2011    Procedure:ARTHROSCOPY SHOULDER DECOMPRESSION; Right Shoulder Arthroscopic Assessment, Subcromial Decompression, microfracture cohondrail defect repair  ; Surgeon:CAROLE WALKER; Location:[facility] OR     HC KNEE SCOPE,MED/LAT MENISECTOMY  12/2004    medial meniscectomy, right knee     HC VASECTOMY UNILAT/BILAT W POSTOP SEMEN       SURGICAL HISTORY OF -       resection Hodgkins, salivary glands, node dissection     ZZC APPENDECTOMY       Current Outpatient Medications   Medication Sig Dispense " "Refill     atorvastatin (LIPITOR) 20 MG tablet Take 1 tablet (20 mg) by mouth daily 90 tablet 3     cetirizine (ZYRTEC) 10 MG tablet Take 10 mg by mouth daily       fluticasone (FLONASE) 50 MCG/ACT nasal spray Spray 1-2 sprays into both nostrils daily 16 g 8     latanoprost (XALATAN) 0.005 % ophthalmic solution Place 1 drop into both eyes At Bedtime.       lisinopril (ZESTRIL) 20 MG tablet Take 1 tablet (20 mg) by mouth daily 90 tablet 1       Allergies   Allergen Reactions     No Known Allergies         Social History     Tobacco Use     Smoking status: Former     Types: Cigarettes     Quit date: 1996     Years since quittin.8     Smokeless tobacco: Never   Substance Use Topics     Alcohol use: Yes     Alcohol/week: 0.0 standard drinks     Comment: rarely       History   Drug Use No         Objective     /72 (BP Location: Left arm, Cuff Size: Adult Large)   Pulse 72   Temp 98.1  F (36.7  C) (Tympanic)   Ht 1.753 m (5' 9\")   Wt 100.2 kg (221 lb)   SpO2 98%   BMI 32.64 kg/m      Physical Exam    GENERAL APPEARANCE: healthy, alert and no distress     EYES: EOMI,  PERRL     HENT: ear canals and TM's normal and nose and mouth without ulcers or lesions     NECK: no adenopathy, no asymmetry, masses, or scars and thyroid normal to palpation     RESP: lungs clear to auscultation - no rales, rhonchi or wheezes     CV: regular rates and rhythm, normal S1 S2, no S3 or S4 and no murmur, click or rub     ABDOMEN:  soft, nontender, no HSM or masses and bowel sounds normal     MS: extremities normal- no gross deformities noted, no evidence of inflammation in joints, FROM in all extremities.     SKIN: no suspicious lesions or rashes     NEURO: Normal strength and tone, sensory exam grossly normal, mentation intact and speech normal     PSYCH: mentation appears normal. and affect normal/bright     LYMPHATICS: No cervical adenopathy    Recent Labs   Lab Test 22  1526 21  0854    139   POTASSIUM " 3.9 4.2   CR 0.96 1.03      Results for orders placed or performed in visit on 10/21/22   Basic metabolic panel  (Ca, Cl, CO2, Creat, Gluc, K, Na, BUN)     Status: Abnormal   Result Value Ref Range    Sodium 140 133 - 144 mmol/L    Potassium 4.4 3.4 - 5.3 mmol/L    Chloride 108 94 - 109 mmol/L    Carbon Dioxide (CO2) 27 20 - 32 mmol/L    Anion Gap 5 3 - 14 mmol/L    Urea Nitrogen 21 7 - 30 mg/dL    Creatinine 0.87 0.66 - 1.25 mg/dL    Calcium 9.4 8.5 - 10.1 mg/dL    Glucose 104 (H) 70 - 99 mg/dL    GFR Estimate >90 >60 mL/min/1.73m2   CBC with platelets     Status: Normal   Result Value Ref Range    WBC Count 5.9 4.0 - 11.0 10e3/uL    RBC Count 4.98 4.40 - 5.90 10e6/uL    Hemoglobin 15.2 13.3 - 17.7 g/dL    Hematocrit 44.0 40.0 - 53.0 %    MCV 88 78 - 100 fL    MCH 30.5 26.5 - 33.0 pg    MCHC 34.5 31.5 - 36.5 g/dL    RDW 13.0 10.0 - 15.0 %    Platelet Count 209 150 - 450 10e3/uL       Diagnostics:  Labs per above  No EKG required, no history of coronary heart disease, significant arrhythmia, peripheral arterial disease or other structural heart disease.    Revised Cardiac Risk Index (RCRI):  The patient has the following serious cardiovascular risks for perioperative complications:   - No serious cardiac risks = 0 points     RCRI Interpretation: 0 points: Class I (very low risk - 0.4% complication rate)           Signed Electronically by: Petty White PA-C  Copy of this evaluation report is provided to requesting physician.

## 2022-10-24 NOTE — RESULT ENCOUNTER NOTE
Dear Sascha,      Your recent test results are noted below:    -Normal red blood cell (hgb) levels, normal white blood cell count and normal platelet levels.  -Kidney function (GFR) is normal.  -Sodium is normal.  -Potassium is normal.  -Calcium is normal.  -Glucose is mildly elevated but you were nonfasting and this is okay.     Ok to proceed with surgery as planned. I hope all goes well for you!    For additional lab test information, labtestsonline.org is an excellent reference. Please contact the clinic at [phone] with any further questions or concerns.    Sincerely,      Petty White PA-C  [facility]

## 2022-12-08 DIAGNOSIS — I10 BENIGN HYPERTENSION: ICD-10-CM

## 2022-12-12 RX ORDER — LISINOPRIL 20 MG/1
TABLET ORAL
Qty: 90 TABLET | Refills: 1 | Status: SHIPPED | OUTPATIENT
Start: 2022-12-12 | End: 2023-03-15

## 2022-12-12 NOTE — TELEPHONE ENCOUNTER
BMP done on 6/29 & 10/21(WNL). Refilled as per protocol.     Notes from 6/2/22:  Benign hypertension  Not at goal. Increased lisinopril to 20 mg daily.  Recheck in 1-2 weeks.  Recheck BMP at that time.    BP Readings from Last 4 Encounters:   10/21/22 118/72   06/02/22 (!) 152/92   10/24/21 (!) 140/84   07/20/21 (!) 162/97     ABEL Olmedo  Patient Advocate Liason (PAL)  [facility]  Ph. [phone] / Fax. [phone]

## 2022-12-13 ENCOUNTER — TRANSFERRED RECORDS (OUTPATIENT)
Dept: HEALTH INFORMATION MANAGEMENT | Facility: CLINIC | Age: 47
End: 2022-12-13

## 2023-03-09 DIAGNOSIS — E78.5 HYPERLIPIDEMIA LDL GOAL <160: ICD-10-CM

## 2023-03-10 RX ORDER — ATORVASTATIN CALCIUM 20 MG/1
TABLET, FILM COATED ORAL
Qty: 30 TABLET | Refills: 0 | Status: SHIPPED | OUTPATIENT
Start: 2023-03-10 | End: 2023-03-15

## 2023-03-10 NOTE — TELEPHONE ENCOUNTER
Routing refill request to provider for review/approval because:  Labs not current:    LDL Cholesterol Calculated   Date Value Ref Range Status   03/18/2021 101 (H) <100 mg/dL Final     Comment:     Above desirable:  100-129 mg/dl  Borderline High:  130-159 mg/dL  High:             160-189 mg/dL  Very high:       >189 mg/dl       Appointment in 5 days.  Della Rodrigez RN

## 2023-03-15 ENCOUNTER — PATIENT OUTREACH (OUTPATIENT)
Dept: FAMILY MEDICINE | Facility: CLINIC | Age: 48
End: 2023-03-15

## 2023-03-15 ENCOUNTER — OFFICE VISIT (OUTPATIENT)
Dept: FAMILY MEDICINE | Facility: CLINIC | Age: 48
End: 2023-03-15
Payer: COMMERCIAL

## 2023-03-15 VITALS
DIASTOLIC BLOOD PRESSURE: 78 MMHG | WEIGHT: 232.25 LBS | HEART RATE: 63 BPM | TEMPERATURE: 97.6 F | BODY MASS INDEX: 34.4 KG/M2 | RESPIRATION RATE: 20 BRPM | OXYGEN SATURATION: 95 % | SYSTOLIC BLOOD PRESSURE: 126 MMHG | HEIGHT: 69 IN

## 2023-03-15 DIAGNOSIS — Z12.11 COLON CANCER SCREENING: ICD-10-CM

## 2023-03-15 DIAGNOSIS — E78.5 HYPERLIPIDEMIA LDL GOAL <160: ICD-10-CM

## 2023-03-15 DIAGNOSIS — E66.09 CLASS 1 OBESITY DUE TO EXCESS CALORIES WITHOUT SERIOUS COMORBIDITY WITH BODY MASS INDEX (BMI) OF 34.0 TO 34.9 IN ADULT: ICD-10-CM

## 2023-03-15 DIAGNOSIS — E04.1 THYROID NODULE: Primary | ICD-10-CM

## 2023-03-15 DIAGNOSIS — I10 BENIGN HYPERTENSION: ICD-10-CM

## 2023-03-15 DIAGNOSIS — H93.11 TINNITUS, RIGHT: ICD-10-CM

## 2023-03-15 DIAGNOSIS — G62.9 NEUROPATHY: Primary | ICD-10-CM

## 2023-03-15 DIAGNOSIS — E04.1 THYROID NODULE: ICD-10-CM

## 2023-03-15 DIAGNOSIS — J30.9 ALLERGIC RHINITIS, UNSPECIFIED SEASONALITY, UNSPECIFIED TRIGGER: ICD-10-CM

## 2023-03-15 DIAGNOSIS — Z12.11 SCREEN FOR COLON CANCER: ICD-10-CM

## 2023-03-15 DIAGNOSIS — E66.811 CLASS 1 OBESITY DUE TO EXCESS CALORIES WITHOUT SERIOUS COMORBIDITY WITH BODY MASS INDEX (BMI) OF 34.0 TO 34.9 IN ADULT: ICD-10-CM

## 2023-03-15 LAB
ALBUMIN SERPL BCG-MCNC: 4.8 G/DL (ref 3.5–5.2)
ALP SERPL-CCNC: 62 U/L (ref 40–129)
ALT SERPL W P-5'-P-CCNC: 59 U/L (ref 10–50)
ANION GAP SERPL CALCULATED.3IONS-SCNC: 13 MMOL/L (ref 7–15)
AST SERPL W P-5'-P-CCNC: 47 U/L (ref 10–50)
BILIRUB SERPL-MCNC: 0.4 MG/DL
BUN SERPL-MCNC: 19 MG/DL (ref 6–20)
CALCIUM SERPL-MCNC: 9.6 MG/DL (ref 8.6–10)
CHLORIDE SERPL-SCNC: 105 MMOL/L (ref 98–107)
CHOLEST SERPL-MCNC: 197 MG/DL
CREAT SERPL-MCNC: 0.93 MG/DL (ref 0.67–1.17)
DEPRECATED HCO3 PLAS-SCNC: 25 MMOL/L (ref 22–29)
GFR SERPL CREATININE-BSD FRML MDRD: >90 ML/MIN/1.73M2
GLUCOSE SERPL-MCNC: 95 MG/DL (ref 70–99)
HBA1C MFR BLD: 5.7 % (ref 0–5.6)
HDLC SERPL-MCNC: 30 MG/DL
LDLC SERPL CALC-MCNC: ABNORMAL MG/DL
LDLC SERPL DIRECT ASSAY-MCNC: 100 MG/DL
NONHDLC SERPL-MCNC: 167 MG/DL
POTASSIUM SERPL-SCNC: 4.2 MMOL/L (ref 3.4–5.3)
PROT SERPL-MCNC: 7.5 G/DL (ref 6.4–8.3)
SODIUM SERPL-SCNC: 143 MMOL/L (ref 136–145)
TRIGL SERPL-MCNC: 433 MG/DL
TSH SERPL DL<=0.005 MIU/L-ACNC: 3.72 UIU/ML (ref 0.3–4.2)
VIT B12 SERPL-MCNC: 892 PG/ML (ref 232–1245)

## 2023-03-15 PROCEDURE — 84443 ASSAY THYROID STIM HORMONE: CPT | Performed by: FAMILY MEDICINE

## 2023-03-15 PROCEDURE — 36415 COLL VENOUS BLD VENIPUNCTURE: CPT | Performed by: FAMILY MEDICINE

## 2023-03-15 PROCEDURE — 82607 VITAMIN B-12: CPT | Performed by: FAMILY MEDICINE

## 2023-03-15 PROCEDURE — 80061 LIPID PANEL: CPT | Performed by: FAMILY MEDICINE

## 2023-03-15 PROCEDURE — 80053 COMPREHEN METABOLIC PANEL: CPT | Performed by: FAMILY MEDICINE

## 2023-03-15 PROCEDURE — 99214 OFFICE O/P EST MOD 30 MIN: CPT | Performed by: FAMILY MEDICINE

## 2023-03-15 PROCEDURE — 83036 HEMOGLOBIN GLYCOSYLATED A1C: CPT | Performed by: FAMILY MEDICINE

## 2023-03-15 PROCEDURE — 83721 ASSAY OF BLOOD LIPOPROTEIN: CPT | Mod: 59 | Performed by: FAMILY MEDICINE

## 2023-03-15 RX ORDER — FLUTICASONE PROPIONATE 50 MCG
1-2 SPRAY, SUSPENSION (ML) NASAL DAILY
Qty: 16 G | Refills: 8 | Status: SHIPPED | OUTPATIENT
Start: 2023-03-15

## 2023-03-15 RX ORDER — PREGABALIN 150 MG/1
150 CAPSULE ORAL 2 TIMES DAILY
COMMUNITY
Start: 2023-03-10

## 2023-03-15 RX ORDER — LISINOPRIL 20 MG/1
20 TABLET ORAL DAILY
Qty: 90 TABLET | Refills: 3 | Status: SHIPPED | OUTPATIENT
Start: 2023-03-15 | End: 2024-03-11

## 2023-03-15 RX ORDER — ATORVASTATIN CALCIUM 20 MG/1
20 TABLET, FILM COATED ORAL DAILY
Qty: 90 TABLET | Refills: 3 | Status: SHIPPED | OUTPATIENT
Start: 2023-03-15 | End: 2024-03-11

## 2023-03-15 NOTE — TELEPHONE ENCOUNTER
Patients wife, Olga Lidia left message requesting referral for US be sent to [facility], [place] as this is patients insurance preferred imaging provider.     Thyroid nodule  Will get ultrasound and labs   - US Head Neck Soft Tissue    RN faxed order as requested. Informed patient via [facility].     Teresa HAMMOND RN, BSN, PHN - PAL (patient advocate liaison)  [facility]  [phone]

## 2023-03-15 NOTE — PROGRESS NOTES
"  Assessment & Plan     Hyperlipidemia LDL goal <160  Under control  Continue    - atorvastatin (LIPITOR) 20 MG tablet  Dispense: 90 tablet; Refill: 3  - Lipid panel reflex to direct LDL Non-fasting  - Comprehensive metabolic panel (BMP + Alb, Alk Phos, ALT, AST, Total. Bili, TP)    Benign hypertension  Under control here but might be higher at home  Get home monitor and recheck in 6 months    - lisinopril (ZESTRIL) 20 MG tablet  Dispense: 90 tablet; Refill: 3  - Home Blood Pressure Monitor Order for DME - ONLY FOR DME    Screen for colon cancer  cologuard    Colon cancer screening    - COLOGUARD([employer])    Allergic rhinitis, unspecified seasonality, unspecified trigger  continue  - fluticasone (FLONASE) 50 MCG/ACT nasal spray  Dispense: 16 g; Refill: 8    Neuropathy  Seeing spine doc    - REVIEW OF HEALTH MAINTENANCE PROTOCOL ORDERS  - tiZANidine (ZANAFLEX) 4 MG tablet  Dispense: 30 tablet; Refill: 11  - Comprehensive metabolic panel (BMP + Alb, Alk Phos, ALT, AST, Total. Bili, TP)  - Hemoglobin A1c  - TSH with free T4 reflex  - Vitamin B12    Thyroid nodule  Will get ultrasound and labs   - US Head Neck Soft Tissue    Tinnitus, right    - Adult Audiology  Referral    Class 1 obesity due to excess calories without serious comorbidity with body mass index (BMI) of 34.0 to 34.9 in adult  Will discuss further a physical         30 minutes spent on the date of the encounter doing chart review, history and exam, documentation and further activities per the note       BMI:   Estimated body mass index is 34.3 kg/m  as calculated from the following:    Height as of this encounter: 1.753 m (5' 9\").    Weight as of this encounter: 105.3 kg (232 lb 4 oz).       See Patient Instructions    No follow-ups on file.    Arleen Enrique MD  [facility]    Florentino Caicedo is a 47 year old, presenting for the following health issues:  Thyroid Problem (1 -f/u MRI) and Establish Care (2. " Discuss neuropathy concerns)  he is here to establish primary care.   He has a few issues:  1- he was found to have thyroid nodule incidentally a few months ago on MRI of his cervical spine  2- he has neuropathy of feet and upper extremities.   His spine docs are checking thoracic and lumbar MRI now after the cervical.   The MRI a few months ago showed mild to moderate DDD but no major stenosis.   3- he also has ringing in this right ear since last fall.   It gets worse at times.   And then it will get better.     History of Present Illness       Reason for visit:  Nodule on thyroid  Symptom onset:  More than a month  Symptoms include:  Fatigue  Symptom intensity:  Moderate  Symptom progression:  Staying the same  Had these symptoms before:  No  What makes it worse:  Being active  What makes it better:  Rest    He eats 0-1 servings of fruits and vegetables daily.He consumes 0 sweetened beverage(s) daily.He exercises with enough effort to increase his heart rate 9 or less minutes per day.  He exercises with enough effort to increase his heart rate 3 or less days per week.   He is taking medications regularly.       Concern - Thyroid concerns and establish care       Past Medical History:   Diagnosis Date     Glaucoma      Other and unspecified hyperlipidemia      Personal history of Hodgkin's disease 01/01/2000    radiation rx, stage 1A, neck mass     Recurrent Depression/Anxiety 04/07/2005     Recurrent Depression/Anxiety, Full Remission 04/07/2005     Thyroid nodule 12/2022    found on neck MRI - needs ultrasound     Typical Chest Pain, Related to Anxiety 08/01/2004    negative workup including EKG and stress echo        Past Surgical History:   Procedure Laterality Date     ARTHROSCOPY SHOULDER DECOMPRESSION  12/15/2011    Procedure:ARTHROSCOPY SHOULDER DECOMPRESSION; Right Shoulder Arthroscopic Assessment, Subcromial Decompression, microfracture cohondrail defect repair  ; Surgeon:CAROLE WALKER; Location: OR  "    HC KNEE SCOPE,MED/LAT MENISECTOMY  2004    medial meniscectomy, right knee     HC VASECTOMY UNILAT/BILAT W POSTOP SEMEN       MICRODISCECTOMY LUMBAR  10/2022    [facility] - done at L3-L4     SURGICAL HISTORY OF -       resection Hodgkins, salivary glands, node dissection     ZZC APPENDECTOMY         MEDICATIONS:  Current Outpatient Medications   Medication     atorvastatin (LIPITOR) 20 MG tablet     cetirizine (ZYRTEC) 10 MG tablet     fluticasone (FLONASE) 50 MCG/ACT nasal spray     latanoprost (XALATAN) 0.005 % ophthalmic solution     lisinopril (ZESTRIL) 20 MG tablet     tiZANidine (ZANAFLEX) 4 MG tablet     pregabalin (LYRICA) 150 MG capsule     No current facility-administered medications for this visit.       SOCIAL HISTORY:  Social History     Tobacco Use     Smoking status: Former     Types: Cigarettes     Quit date: 1996     Years since quittin.2     Smokeless tobacco: Never     Tobacco comments:     5 years - 1 pack per week   Substance Use Topics     Alcohol use: Yes     Alcohol/week: 0.0 standard drinks     Comment: rarely       Family History   Problem Relation Age of Onset     Cancer Mother      Gallbladder Disease Mother      Melanoma Mother      Gallbladder Disease Father      C.A.D. Maternal Grandfather      Diabetes Maternal Aunt      Diabetes Type 2  Paternal Aunt      Colon Cancer No family hx of          Review of Systems   Constitutional, HEENT, cardiovascular, pulmonary, gi and gu systems are negative, except as otherwise noted.      Objective    /78 (BP Location: Right arm, Patient Position: Sitting, Cuff Size: Adult Large)   Pulse 63   Temp 97.6  F (36.4  C) (Oral)   Resp 20   Ht 1.753 m (5' 9\")   Wt 105.3 kg (232 lb 4 oz)   SpO2 95%   BMI 34.30 kg/m    Body mass index is 34.3 kg/m .  Physical Exam   GENERAL: alert, no distress and obese  EYES: Eyes grossly normal to inspection, PERRL and conjunctivae and sclerae normal  HENT: ear canals and TM's normal, nose and " mouth without ulcers or lesions  NECK: no adenopathy, no asymmetry, masses, or scars and thyroid normal to palpation  RESP: lungs clear to auscultation - no rales, rhonchi or wheezes  CV: regular rate and rhythm, normal S1 S2, no S3 or S4, no murmur, click or rub, no peripheral edema and peripheral pulses strong  ABDOMEN: soft, nontender, no hepatosplenomegaly, no masses and bowel sounds normal  MS: no gross musculoskeletal defects noted, no edema  SKIN: no suspicious lesions or rashes  PSYCH: mentation appears normal, affect normal/bright  LYMPH: no cervical, supraclavicular, axillary, or inguinal adenopathy    Office Visit on 10/21/2022   Component Date Value Ref Range Status     Sodium 10/21/2022 140  133 - 144 mmol/L Final     Potassium 10/21/2022 4.4  3.4 - 5.3 mmol/L Final     Chloride 10/21/2022 108  94 - 109 mmol/L Final     Carbon Dioxide (CO2) 10/21/2022 27  20 - 32 mmol/L Final     Anion Gap 10/21/2022 5  3 - 14 mmol/L Final     Urea Nitrogen 10/21/2022 21  7 - 30 mg/dL Final     Creatinine 10/21/2022 0.87  0.66 - 1.25 mg/dL Final     Calcium 10/21/2022 9.4  8.5 - 10.1 mg/dL Final     Glucose 10/21/2022 104 (H)  70 - 99 mg/dL Final     GFR Estimate 10/21/2022 >90  >60 mL/min/1.73m2 Final    Effective December 21, 2021 eGFRcr in adults is calculated using the 2021 CKD-EPI creatinine equation which includes age and gender (Madelyn et al., [place], DOI: 10.1056/YBRJqf3578828)     WBC Count 10/21/2022 5.9  4.0 - 11.0 10e3/uL Final     RBC Count 10/21/2022 4.98  4.40 - 5.90 10e6/uL Final     Hemoglobin 10/21/2022 15.2  13.3 - 17.7 g/dL Final     Hematocrit 10/21/2022 44.0  40.0 - 53.0 % Final     MCV 10/21/2022 88  78 - 100 fL Final     MCH 10/21/2022 30.5  26.5 - 33.0 pg Final     MCHC 10/21/2022 34.5  31.5 - 36.5 g/dL Final     RDW 10/21/2022 13.0  10.0 - 15.0 % Final     Platelet Count 10/21/2022 209  150 - 450 10e3/uL Final

## 2023-03-16 ENCOUNTER — LAB (OUTPATIENT)
Dept: FAMILY MEDICINE | Facility: CLINIC | Age: 48
End: 2023-03-16
Payer: COMMERCIAL

## 2023-03-16 DIAGNOSIS — Z12.11 COLON CANCER SCREENING: ICD-10-CM

## 2023-03-17 ENCOUNTER — TRANSFERRED RECORDS (OUTPATIENT)
Dept: FAMILY MEDICINE | Facility: CLINIC | Age: 48
End: 2023-03-17

## 2023-04-07 LAB — NONINV COLON CA DNA+OCC BLD SCRN STL QL: NEGATIVE

## 2023-04-16 ENCOUNTER — HEALTH MAINTENANCE LETTER (OUTPATIENT)
Age: 48
End: 2023-04-16

## 2023-04-28 ENCOUNTER — TELEPHONE (OUTPATIENT)
Dept: FAMILY MEDICINE | Facility: CLINIC | Age: 48
End: 2023-04-28
Payer: COMMERCIAL

## 2023-04-28 NOTE — TELEPHONE ENCOUNTER
Called pt and relayed provider message below. Patient was given an opportunity to ask questions, verbalized understanding of plan, and is agreeable.    Maile Singer RN

## 2023-04-28 NOTE — TELEPHONE ENCOUNTER
----- Message from Arleen Enrique MD sent at 4/27/2023  7:49 PM CDT -----  The thyroid nodules appear to be benign and no follow up was recommended

## 2024-03-11 ENCOUNTER — VIRTUAL VISIT (OUTPATIENT)
Dept: FAMILY MEDICINE | Facility: CLINIC | Age: 49
End: 2024-03-11
Payer: COMMERCIAL

## 2024-03-11 DIAGNOSIS — E78.5 HYPERLIPIDEMIA LDL GOAL <160: ICD-10-CM

## 2024-03-11 DIAGNOSIS — R73.03 PREDIABETES: ICD-10-CM

## 2024-03-11 DIAGNOSIS — G62.9 NEUROPATHY: ICD-10-CM

## 2024-03-11 DIAGNOSIS — I10 BENIGN HYPERTENSION: ICD-10-CM

## 2024-03-11 DIAGNOSIS — E04.1 THYROID NODULE: ICD-10-CM

## 2024-03-11 DIAGNOSIS — N52.9 ERECTILE DYSFUNCTION, UNSPECIFIED ERECTILE DYSFUNCTION TYPE: Primary | ICD-10-CM

## 2024-03-11 PROCEDURE — 99214 OFFICE O/P EST MOD 30 MIN: CPT | Mod: 95 | Performed by: FAMILY MEDICINE

## 2024-03-11 RX ORDER — DULOXETIN HYDROCHLORIDE 60 MG/1
60 CAPSULE, DELAYED RELEASE ORAL DAILY
COMMUNITY
Start: 2023-04-01

## 2024-03-11 RX ORDER — ATORVASTATIN CALCIUM 20 MG/1
20 TABLET, FILM COATED ORAL DAILY
Qty: 90 TABLET | Refills: 0 | Status: SHIPPED | OUTPATIENT
Start: 2024-03-11 | End: 2024-06-17

## 2024-03-11 RX ORDER — LISINOPRIL 20 MG/1
20 TABLET ORAL DAILY
Qty: 90 TABLET | Refills: 0 | Status: SHIPPED | OUTPATIENT
Start: 2024-03-11 | End: 2024-06-17

## 2024-03-11 RX ORDER — SILDENAFIL 50 MG/1
50 TABLET, FILM COATED ORAL DAILY PRN
Qty: 12 TABLET | Refills: 1 | Status: SHIPPED | OUTPATIENT
Start: 2024-03-11 | End: 2024-07-02

## 2024-03-11 NOTE — PROGRESS NOTES
"Sascha is a 48 year old who is being evaluated via a billable video visit.      How would you like to obtain your AVS? MyChart  If the video visit is dropped, the invitation should be resent by: Text to cell phone: [phone]  Will anyone else be joining your video visit? No      Assessment & Plan     Hyperlipidemia LDL goal <160  Under control   Discussed other options for eval heart risk - could consider calcium score    - atorvastatin (LIPITOR) 20 MG tablet  Dispense: 90 tablet; Refill: 0  - Comprehensive metabolic panel (BMP + Alb, Alk Phos, ALT, AST, Total. Bili, TP)  - Lipid panel reflex to direct LDL Fasting    Benign hypertension  Mostly under control  Recheck in 2 months at physical  Continue [facility]opril for now  [facility]    - lisinopril (ZESTRIL) 20 MG tablet  Dispense: 90 tablet; Refill: 0  - PRIMARY CARE FOLLOW-UP SCHEDULING  - Comprehensive metabolic panel (BMP + Alb, Alk Phos, ALT, AST, Total. Bili, TP)    Neuropathy  Sees neurology for this and they manage refills of these meds  Stable for now    Erectile dysfunction, unspecified erectile dysfunction type  New  Will do trial of low dose sildenafil  The potential side effects of the prescription medication were discussed with the patient.    - REVIEW OF HEALTH MAINTENANCE PROTOCOL ORDERS  - Testosterone Free and Total  - sildenafil (VIAGRA) 50 MG tablet  Dispense: 12 tablet; Refill: 1    Prediabetes  Recheck due   - Hemoglobin A1c  - Comprehensive metabolic panel (BMP + Alb, Alk Phos, ALT, AST, Total. Bili, TP)    Thyroid nodule  Check once per year  - TSH with free T4 reflex        24 minutes spent by me on the date of the encounter doing chart review, history and exam, documentation and further activities per the note      BMI  Estimated body mass index is 34.3 kg/m  as calculated from the following:    Height as of 3/15/23: 1.753 m (5' 9\").    Weight as of 3/15/23: 105.3 kg (232 lb 4 oz).         FUTURE LABS:       - Schedule a fasting " blood draw in next few weeks  FUTURE APPOINTMENTS:       - Follow-up visit in physical and BP check in 2-3 months   Regular exercise  See Patient Instructions    Florentino Caicedo is a 48 year old, presenting for the following health issues:   he checks his BP at other providers and dentist and at home.  Usually 122-130 over 80's .   Today he has stressful things going on and it was higher.   He is feeing well overall.      He is concerned about ED.    At fist it was intermittent and now more consistent    he can get an erection sometimes but it is not sustained.    This has been about a year.     He has not tried anything for this.   He is on a statin but has never been dx with heart disease.   He does not have nitroglycerin.   He does not have chest pain.    Hypertension and Lipids        3/11/2024     5:15 PM   Additional Questions   Roomed by Christina HUFFMAN CMA     History of Present Illness       Hyperlipidemia:  He presents for follow up of hyperlipidemia.   He is taking medication to lower cholesterol. He is not having myalgia or other side effects to statin medications.    Hypertension: He presents for follow up of hypertension.  He does check blood pressure  regularly outside of the clinic. Outside blood pressures have been over 140/90. He does not follow a low salt diet.     He eats 0-1 servings of fruits and vegetables daily.He consumes 0 sweetened beverage(s) daily.He exercises with enough effort to increase his heart rate 30 to 60 minutes per day.  He exercises with enough effort to increase his heart rate 4 days per week.   He is taking medications regularly.     Also noted that he has some family stuff going on and thinks that could be contributing to his blood pressure being high.     Past Medical History:   Diagnosis Date    Glaucoma     Other and unspecified hyperlipidemia     Personal history of Hodgkin's disease 01/01/2000    radiation rx, stage 1A, neck mass    Recurrent Depression/Anxiety  2005    Recurrent Depression/Anxiety, Full Remission 2005    Thyroid nodule 2022    ultrasound done 3/2023 showed 3 that had benign features - no follow up is needed    Typical Chest Pain, Related to Anxiety 2004    negative workup including EKG and stress echo        Past Surgical History:   Procedure Laterality Date    ARTHROSCOPY SHOULDER DECOMPRESSION  12/15/2011    Procedure:ARTHROSCOPY SHOULDER DECOMPRESSION; Right Shoulder Arthroscopic Assessment, Subcromial Decompression, microfracture cohondrail defect repair  ; Surgeon:CAROLE WALKER; Location:[facility] OR    HC KNEE SCOPE,MED/LAT MENISECTOMY  2004    medial meniscectomy, right knee    HC VASECTOMY UNILAT/BILAT W POSTOP SEMEN      MICRODISCECTOMY LUMBAR  10/2022    TCO - done at [facility]    SURGICAL HISTORY OF -       resection Hodgkins, salivary glands, node dissection    ZZC APPENDECTOMY         MEDICATIONS:  Current Outpatient Medications   Medication    atorvastatin (LIPITOR) 20 MG tablet    cetirizine (ZYRTEC) 10 MG tablet    DULoxetine (CYMBALTA) 60 MG capsule    fluticasone (FLONASE) 50 MCG/ACT nasal spray    latanoprost (XALATAN) 0.005 % ophthalmic solution    lisinopril (ZESTRIL) 20 MG tablet    pregabalin (LYRICA) 150 MG capsule    sildenafil (VIAGRA) 50 MG tablet    tiZANidine (ZANAFLEX) 4 MG tablet     No current facility-administered medications for this visit.       SOCIAL HISTORY:  Social History     Tobacco Use    Smoking status: Former     Types: Cigarettes     Quit date: 1996     Years since quittin.2    Smokeless tobacco: Never    Tobacco comments:     5 years - 1 pack per week   Substance Use Topics    Alcohol use: Yes     Alcohol/week: 0.0 standard drinks of alcohol     Comment: rarely       Family History   Problem Relation Age of Onset    Cancer Mother     Gallbladder Disease Mother     Melanoma Mother     Gallbladder Disease Father     C.A.D. Maternal Grandfather     Diabetes Maternal Aunt     Diabetes  "Type 2  Paternal Aunt     Colon Cancer No family hx of              Review of Systems  Constitutional, HEENT, cardiovascular, pulmonary, gi and gu systems are negative, except as otherwise noted.      Objective    Vitals - Patient Reported  Systolic (Patient Reported): (!) 170  Diastolic (Patient Reported): (!) 100  Weight (Patient Reported): 104.3 kg (230 lb)  Height (Patient Reported): 175.3 cm (5' 9\")  BMI (Based on Pt Reported Ht/Wt): 33.96  Pulse (Patient Reported): 88  Pain Score: No Pain (0)    Vitals:  No vitals were obtained today due to virtual visit.    Physical Exam   GENERAL: alert and no distress  EYES: Eyes grossly normal to inspection.  No discharge or erythema, or obvious scleral/conjunctival abnormalities.  RESP: No audible wheeze, cough, or visible cyanosis.    SKIN: Visible skin clear. No significant rash, abnormal pigmentation or lesions.  NEURO: Cranial nerves grossly intact.  Mentation and speech appropriate for age.  PSYCH: Appropriate affect, tone, and pace of words    Lab on 03/16/2023   Component Date Value Ref Range Status    COLOGUARD-ABSTRACT 03/29/2023 Negative  Negative Final    Comment:   NEGATIVE TEST RESULT. A negative Cologuard result indicates a low likelihood that a colorectal cancer (CRC) or advanced adenoma (adenomatous polyps with more advanced pre-malignant features)  is present. The chance that a person with a negative Cologuard test has a colorectal cancer is less than 1 in 1500 (negative predictive value >99.9%) or has an  advanced adenoma is less than  5.3% (negative predictive value 94.7%). These data are based on a prospective cross-sectional study of 10,000 individuals at average risk for colorectal cancer who were screened with both Cologuard and colonoscopy. (Jurgen Ellison al, N Engl J Med 2014;370(14):5840-6014) The normal value (reference range) for this assay is negative.    COLOGUARD RE-SCREENING RECOMMENDATION: Periodic colorectal cancer screening is an " important part of preventive healthcare for asymptomatic individuals at average risk for colorectal cancer.  Following a negative Cologuard result, the American Cancer Society and U.S.                            Multi-Society Task Force screening guidelines recommend a Cologuard re-screening interval of 3 years.   References: American Cancer Society Guideline for Colorectal Cancer Screening: https://www.cancer.org/cancer/colon-rectal-cancer/vbexrzwso-cxmvjozyo-duwzxfr/acs-recommendations.html.; Morgan DK, Asael CR, Phyllis FUENTESK, Colorectal Cancer Screening: Recommendations for Physicians and Patients from the U.S. Multi-Society Task Force on Colorectal Cancer Screening , Am J Gastroenterology 2017; 112:9199-8608.    TEST DESCRIPTION: Composite algorithmic analysis of stool DNA-biomarkers with hemoglobin immunoassay.   Quantitative values of individual biomarkers are not reportable and are not associated with individual biomarker result reference ranges. Cologuard is intended for colorectal cancer screening of adults of either sex, 45 years or older, who are at average-risk for colorectal cancer (CRC). Cologuard has been approved for use by the U.S. FDA. The performance of Cologuard was                            established in a cross sectional study of average-risk adults aged 50-84. Cologuard performance in patients ages 45 to 49 years was estimated by sub-group analysis of near-age groups. Colonoscopies performed for a positive result may find as the most clinically significant lesion: colorectal cancer [4.0%], advanced adenoma (including sessile serrated polyps greater than or equal to 1cm diameter) [20%] or non- advanced adenoma [31%]; or no colorectal neoplasia [45%]. These estimates are derived from a prospective cross-sectional screening study of 10,000 individuals at average risk for colorectal cancer who were screened with both Cologuard and colonoscopy. (Jurgen Ellison al, N Engl J Med  2014;370(87):8664-5996.) Cologuard may produce a false negative or false positive result (no colorectal cancer or precancerous polyp present at colonoscopy follow up). A negative Cologuard test result does not guarantee the absence of CRC or advanced adenoma (pre-cancer). The current Cologuard                            screening interval is every 3 years. (American Cancer Society and U.S. Multi-Society Task Force). Cologuard performance data in a 10,000 patient pivotal study using colonoscopy as the reference method can be accessed at the following location: www.FastSpring/results. Additional description of the Cologuard test process, warnings and precautions can be found at www.SmartCloudogSouktelrd.Cellcrypt.           Video-Visit Details    Type of service:  Video Visit     Originating Location (pt. Location): Home    Distant Location (provider location):  On-site  Platform used for Video Visit: Johan  Signed Electronically by: Arleen Enrique MD

## 2024-03-11 NOTE — Clinical Note
Please help get him schedule for his physical in 2-3 months.   He is to schedule lab only in next few weeks - if he has not done so, can also help him get this scheduled too

## 2024-03-12 NOTE — PROGRESS NOTES
LVM for patient to call the clinic at [phone] to schedule an lab appt in a few weeks and also AWV in 2-3 Months.  Adele Lyons, TC

## 2024-03-20 ENCOUNTER — LAB (OUTPATIENT)
Dept: LAB | Facility: CLINIC | Age: 49
End: 2024-03-20
Payer: COMMERCIAL

## 2024-03-20 DIAGNOSIS — N52.9 ERECTILE DYSFUNCTION, UNSPECIFIED ERECTILE DYSFUNCTION TYPE: ICD-10-CM

## 2024-03-20 DIAGNOSIS — R73.03 PREDIABETES: ICD-10-CM

## 2024-03-20 DIAGNOSIS — I10 BENIGN HYPERTENSION: ICD-10-CM

## 2024-03-20 DIAGNOSIS — E78.5 HYPERLIPIDEMIA LDL GOAL <160: ICD-10-CM

## 2024-03-20 DIAGNOSIS — E04.1 THYROID NODULE: ICD-10-CM

## 2024-03-20 LAB
ALBUMIN SERPL BCG-MCNC: 4.6 G/DL (ref 3.5–5.2)
ALP SERPL-CCNC: 57 U/L (ref 40–150)
ALT SERPL W P-5'-P-CCNC: 54 U/L (ref 0–70)
ANION GAP SERPL CALCULATED.3IONS-SCNC: 8 MMOL/L (ref 7–15)
AST SERPL W P-5'-P-CCNC: 40 U/L (ref 0–45)
BILIRUB SERPL-MCNC: 0.7 MG/DL
BUN SERPL-MCNC: 15.5 MG/DL (ref 6–20)
CALCIUM SERPL-MCNC: 9.4 MG/DL (ref 8.6–10)
CHLORIDE SERPL-SCNC: 106 MMOL/L (ref 98–107)
CHOLEST SERPL-MCNC: 198 MG/DL
CREAT SERPL-MCNC: 0.97 MG/DL (ref 0.67–1.17)
DEPRECATED HCO3 PLAS-SCNC: 27 MMOL/L (ref 22–29)
EGFRCR SERPLBLD CKD-EPI 2021: >90 ML/MIN/1.73M2
FASTING STATUS PATIENT QL REPORTED: YES
GLUCOSE SERPL-MCNC: 103 MG/DL (ref 70–99)
HBA1C MFR BLD: 5.7 % (ref 0–5.6)
HDLC SERPL-MCNC: 33 MG/DL
LDLC SERPL CALC-MCNC: 117 MG/DL
NONHDLC SERPL-MCNC: 165 MG/DL
POTASSIUM SERPL-SCNC: 4.1 MMOL/L (ref 3.4–5.3)
PROT SERPL-MCNC: 7.1 G/DL (ref 6.4–8.3)
SODIUM SERPL-SCNC: 141 MMOL/L (ref 135–145)
TRIGL SERPL-MCNC: 238 MG/DL
TSH SERPL DL<=0.005 MIU/L-ACNC: 2.51 UIU/ML (ref 0.3–4.2)

## 2024-03-20 PROCEDURE — 83036 HEMOGLOBIN GLYCOSYLATED A1C: CPT

## 2024-03-20 PROCEDURE — 84403 ASSAY OF TOTAL TESTOSTERONE: CPT

## 2024-03-20 PROCEDURE — 84443 ASSAY THYROID STIM HORMONE: CPT

## 2024-03-20 PROCEDURE — 36415 COLL VENOUS BLD VENIPUNCTURE: CPT

## 2024-03-20 PROCEDURE — 84270 ASSAY OF SEX HORMONE GLOBUL: CPT

## 2024-03-20 PROCEDURE — 80053 COMPREHEN METABOLIC PANEL: CPT

## 2024-03-20 PROCEDURE — 80061 LIPID PANEL: CPT

## 2024-03-21 LAB — SHBG SERPL-SCNC: 35 NMOL/L (ref 11–80)

## 2024-03-22 LAB
TESTOST FREE SERPL-MCNC: 9.49 NG/DL
TESTOST SERPL-MCNC: 472 NG/DL (ref 240–950)

## 2024-04-04 ENCOUNTER — TELEPHONE (OUTPATIENT)
Dept: FAMILY MEDICINE | Facility: CLINIC | Age: 49
End: 2024-04-04
Payer: COMMERCIAL

## 2024-04-04 NOTE — LETTER
[facility]  [address] [facility] [address]  [phone]  April 4, 2024    Sascha Pacheco  [address] [place] [facility] [address]    Dear Sascha,    I care about your health and have reviewed your health plan. I have reviewed your medical conditions, medication list, and lab results and am making recommendations based on this review, to better manage your health.    You are in particular need of attention regarding:  -Wellness (Physical) Visit     I am recommending that you:  {recommendations:-schedule a WELLNESS (Physical) APPOINTMENT with me.   I will check fasting labs the same day - nothing to eat except water and meds for 8-10 hours prior. (If you go elsewhere for Wellness visits then please disregard this reminder.)    Here is a list of Health Maintenance topics that are due now or due soon:  Health Maintenance Due   Topic Date Due    ADVANCE CARE PLANNING  Never done    YEARLY PREVENTIVE VISIT  03/11/2023    INFLUENZA VACCINE (1) 09/01/2023       Please call us at [phone] (or use Pur[employer]) to address the above recommendations.     Thank you for trusting [facility] and we appreciate the opportunity to serve you.  We look forward to supporting your healthcare needs in the future.    Healthy Regards,    Arleen Arechiga MD

## 2024-04-04 NOTE — TELEPHONE ENCOUNTER
Patient Quality Outreach    Patient is due for the following:   Physical Preventive Adult Physical    Next Steps:   Schedule a Adult Preventative    Type of outreach:    Sent Medical Referral Source message.      Questions for provider review:    None           July Potts, CMA

## 2024-06-10 ENCOUNTER — PATIENT OUTREACH (OUTPATIENT)
Dept: FAMILY MEDICINE | Facility: CLINIC | Age: 49
End: 2024-06-10
Payer: COMMERCIAL

## 2024-06-10 NOTE — TELEPHONE ENCOUNTER
Patient Quality Outreach    Patient is due for the following:   Hypertension -  BP check  Physical Preventive Adult Physical    Next Steps:   Schedule a nurse only visit for BP Check, and an Adult Preventative    Type of outreach:    Sent Nivela message.      Questions for provider review:    None           Larry Camarillo MA

## 2024-06-17 DIAGNOSIS — I10 BENIGN HYPERTENSION: ICD-10-CM

## 2024-06-17 DIAGNOSIS — E78.5 HYPERLIPIDEMIA LDL GOAL <160: ICD-10-CM

## 2024-06-17 RX ORDER — LISINOPRIL 20 MG/1
20 TABLET ORAL DAILY
Qty: 90 TABLET | Refills: 0 | Status: SHIPPED | OUTPATIENT
Start: 2024-06-17 | End: 2024-09-06

## 2024-06-17 RX ORDER — ATORVASTATIN CALCIUM 20 MG/1
20 TABLET, FILM COATED ORAL DAILY
Qty: 90 TABLET | Refills: 2 | Status: SHIPPED | OUTPATIENT
Start: 2024-06-17

## 2024-06-22 ENCOUNTER — HEALTH MAINTENANCE LETTER (OUTPATIENT)
Age: 49
End: 2024-06-22

## 2024-07-02 DIAGNOSIS — N52.9 ERECTILE DYSFUNCTION, UNSPECIFIED ERECTILE DYSFUNCTION TYPE: ICD-10-CM

## 2024-07-02 RX ORDER — SILDENAFIL 50 MG/1
50 TABLET, FILM COATED ORAL DAILY PRN
Qty: 12 TABLET | Refills: 1 | Status: SHIPPED | OUTPATIENT
Start: 2024-07-02

## 2024-09-06 ENCOUNTER — TELEPHONE (OUTPATIENT)
Dept: FAMILY MEDICINE | Facility: CLINIC | Age: 49
End: 2024-09-06
Payer: COMMERCIAL

## 2024-09-06 DIAGNOSIS — I10 BENIGN HYPERTENSION: ICD-10-CM

## 2024-09-06 RX ORDER — LISINOPRIL 20 MG/1
20 TABLET ORAL DAILY
Qty: 90 TABLET | Refills: 0 | Status: SHIPPED | OUTPATIENT
Start: 2024-09-06

## 2024-09-06 NOTE — TELEPHONE ENCOUNTER
Sent MyChart x1 attempt.  Needs nurse only BP check.      Radha SCHAEFFER, - [facility] 2  [place] [facility] [facility]

## 2024-10-11 ENCOUNTER — PATIENT OUTREACH (OUTPATIENT)
Dept: CARE COORDINATION | Facility: CLINIC | Age: 49
End: 2024-10-11
Payer: COMMERCIAL

## 2024-10-31 DIAGNOSIS — N52.9 ERECTILE DYSFUNCTION, UNSPECIFIED ERECTILE DYSFUNCTION TYPE: ICD-10-CM

## 2024-10-31 RX ORDER — SILDENAFIL 50 MG/1
50 TABLET, FILM COATED ORAL DAILY PRN
Qty: 12 TABLET | Refills: 1 | Status: SHIPPED | OUTPATIENT
Start: 2024-10-31

## 2024-12-04 ENCOUNTER — OFFICE VISIT (OUTPATIENT)
Dept: FAMILY MEDICINE | Facility: CLINIC | Age: 49
End: 2024-12-04
Attending: FAMILY MEDICINE
Payer: COMMERCIAL

## 2024-12-04 ENCOUNTER — TELEPHONE (OUTPATIENT)
Dept: FAMILY MEDICINE | Facility: CLINIC | Age: 49
End: 2024-12-04

## 2024-12-04 VITALS
TEMPERATURE: 98.3 F | HEIGHT: 69 IN | BODY MASS INDEX: 34.18 KG/M2 | HEART RATE: 78 BPM | DIASTOLIC BLOOD PRESSURE: 82 MMHG | OXYGEN SATURATION: 97 % | SYSTOLIC BLOOD PRESSURE: 128 MMHG | RESPIRATION RATE: 12 BRPM | WEIGHT: 230.8 LBS

## 2024-12-04 DIAGNOSIS — Z85.71 HISTORY OF HODGKIN'S DISEASE: ICD-10-CM

## 2024-12-04 DIAGNOSIS — I10 BENIGN HYPERTENSION: ICD-10-CM

## 2024-12-04 DIAGNOSIS — E66.09 CLASS 1 OBESITY DUE TO EXCESS CALORIES WITHOUT SERIOUS COMORBIDITY WITH BODY MASS INDEX (BMI) OF 34.0 TO 34.9 IN ADULT: ICD-10-CM

## 2024-12-04 DIAGNOSIS — R73.03 PREDIABETES: ICD-10-CM

## 2024-12-04 DIAGNOSIS — E78.5 HYPERLIPIDEMIA LDL GOAL <160: ICD-10-CM

## 2024-12-04 DIAGNOSIS — Z00.00 ROUTINE GENERAL MEDICAL EXAMINATION AT A HEALTH CARE FACILITY: Primary | ICD-10-CM

## 2024-12-04 DIAGNOSIS — E66.811 CLASS 1 OBESITY DUE TO EXCESS CALORIES WITHOUT SERIOUS COMORBIDITY WITH BODY MASS INDEX (BMI) OF 34.0 TO 34.9 IN ADULT: ICD-10-CM

## 2024-12-04 LAB
ALBUMIN SERPL BCG-MCNC: 4.7 G/DL (ref 3.5–5.2)
ALP SERPL-CCNC: 69 U/L (ref 40–150)
ALT SERPL W P-5'-P-CCNC: 55 U/L (ref 0–70)
ANION GAP SERPL CALCULATED.3IONS-SCNC: 6 MMOL/L (ref 7–15)
AST SERPL W P-5'-P-CCNC: 39 U/L (ref 0–45)
BILIRUB SERPL-MCNC: 0.4 MG/DL
BUN SERPL-MCNC: 18.7 MG/DL (ref 6–20)
CALCIUM SERPL-MCNC: 9.5 MG/DL (ref 8.8–10.4)
CHLORIDE SERPL-SCNC: 103 MMOL/L (ref 98–107)
CHOLEST SERPL-MCNC: 242 MG/DL
CREAT SERPL-MCNC: 0.95 MG/DL (ref 0.67–1.17)
EGFRCR SERPLBLD CKD-EPI 2021: >90 ML/MIN/1.73M2
ERYTHROCYTE [DISTWIDTH] IN BLOOD BY AUTOMATED COUNT: 13.1 % (ref 10–15)
EST. AVERAGE GLUCOSE BLD GHB EST-MCNC: 137 MG/DL
FASTING STATUS PATIENT QL REPORTED: ABNORMAL
FASTING STATUS PATIENT QL REPORTED: ABNORMAL
GLUCOSE SERPL-MCNC: 100 MG/DL (ref 70–99)
HBA1C MFR BLD: 6.4 % (ref 0–5.6)
HCO3 SERPL-SCNC: 30 MMOL/L (ref 22–29)
HCT VFR BLD AUTO: 45.1 % (ref 40–53)
HDLC SERPL-MCNC: 34 MG/DL
HGB BLD-MCNC: 15.4 G/DL (ref 13.3–17.7)
LDLC SERPL CALC-MCNC: 148 MG/DL
MCH RBC QN AUTO: 30.4 PG (ref 26.5–33)
MCHC RBC AUTO-ENTMCNC: 34.1 G/DL (ref 31.5–36.5)
MCV RBC AUTO: 89 FL (ref 78–100)
NONHDLC SERPL-MCNC: 208 MG/DL
PLATELET # BLD AUTO: 224 10E3/UL (ref 150–450)
POTASSIUM SERPL-SCNC: 4.3 MMOL/L (ref 3.4–5.3)
PROT SERPL-MCNC: 7.5 G/DL (ref 6.4–8.3)
RBC # BLD AUTO: 5.06 10E6/UL (ref 4.4–5.9)
SODIUM SERPL-SCNC: 139 MMOL/L (ref 135–145)
T4 FREE SERPL-MCNC: 0.9 NG/DL (ref 0.9–1.7)
TRIGL SERPL-MCNC: 299 MG/DL
TSH SERPL DL<=0.005 MIU/L-ACNC: 6.35 UIU/ML (ref 0.3–4.2)
WBC # BLD AUTO: 7 10E3/UL (ref 4–11)

## 2024-12-04 PROCEDURE — 84439 ASSAY OF FREE THYROXINE: CPT | Performed by: FAMILY MEDICINE

## 2024-12-04 PROCEDURE — 99396 PREV VISIT EST AGE 40-64: CPT | Mod: 25 | Performed by: FAMILY MEDICINE

## 2024-12-04 PROCEDURE — 90673 RIV3 VACCINE NO PRESERV IM: CPT | Performed by: FAMILY MEDICINE

## 2024-12-04 PROCEDURE — 80053 COMPREHEN METABOLIC PANEL: CPT | Performed by: FAMILY MEDICINE

## 2024-12-04 PROCEDURE — 90472 IMMUNIZATION ADMIN EACH ADD: CPT | Performed by: FAMILY MEDICINE

## 2024-12-04 PROCEDURE — 83036 HEMOGLOBIN GLYCOSYLATED A1C: CPT | Performed by: FAMILY MEDICINE

## 2024-12-04 PROCEDURE — 80061 LIPID PANEL: CPT | Performed by: FAMILY MEDICINE

## 2024-12-04 PROCEDURE — 85027 COMPLETE CBC AUTOMATED: CPT | Performed by: FAMILY MEDICINE

## 2024-12-04 PROCEDURE — 99213 OFFICE O/P EST LOW 20 MIN: CPT | Mod: 25 | Performed by: FAMILY MEDICINE

## 2024-12-04 PROCEDURE — 90750 HZV VACC RECOMBINANT IM: CPT | Performed by: FAMILY MEDICINE

## 2024-12-04 PROCEDURE — 84443 ASSAY THYROID STIM HORMONE: CPT | Performed by: FAMILY MEDICINE

## 2024-12-04 PROCEDURE — 90471 IMMUNIZATION ADMIN: CPT | Performed by: FAMILY MEDICINE

## 2024-12-04 PROCEDURE — 36415 COLL VENOUS BLD VENIPUNCTURE: CPT | Performed by: FAMILY MEDICINE

## 2024-12-04 RX ORDER — PREGABALIN 100 MG/1
100 CAPSULE ORAL DAILY
COMMUNITY
Start: 2024-12-04

## 2024-12-04 RX ORDER — DULOXETIN HYDROCHLORIDE 60 MG/1
120 CAPSULE, DELAYED RELEASE ORAL DAILY
COMMUNITY
Start: 2024-12-04

## 2024-12-04 RX ORDER — TIRZEPATIDE 2.5 MG/.5ML
2.5 INJECTION, SOLUTION SUBCUTANEOUS
Qty: 2 ML | Refills: 5 | Status: SHIPPED | OUTPATIENT
Start: 2024-12-04

## 2024-12-04 RX ORDER — LISINOPRIL 20 MG/1
20 TABLET ORAL DAILY
Qty: 90 TABLET | Refills: 4 | Status: SHIPPED | OUTPATIENT
Start: 2024-12-04

## 2024-12-04 RX ORDER — ATORVASTATIN CALCIUM 20 MG/1
20 TABLET, FILM COATED ORAL DAILY
Qty: 90 TABLET | Refills: 4 | Status: SHIPPED | OUTPATIENT
Start: 2024-12-04

## 2024-12-04 SDOH — HEALTH STABILITY: PHYSICAL HEALTH: ON AVERAGE, HOW MANY MINUTES DO YOU ENGAGE IN EXERCISE AT THIS LEVEL?: PATIENT DECLINED

## 2024-12-04 SDOH — HEALTH STABILITY: PHYSICAL HEALTH
ON AVERAGE, HOW MANY DAYS PER WEEK DO YOU ENGAGE IN MODERATE TO STRENUOUS EXERCISE (LIKE A BRISK WALK)?: PATIENT DECLINED

## 2024-12-04 ASSESSMENT — PAIN SCALES - GENERAL: PAINLEVEL_OUTOF10: MILD PAIN (2)

## 2024-12-04 ASSESSMENT — SOCIAL DETERMINANTS OF HEALTH (SDOH): HOW OFTEN DO YOU GET TOGETHER WITH FRIENDS OR RELATIVES?: PATIENT DECLINED

## 2024-12-04 NOTE — TELEPHONE ENCOUNTER
"See note from PA team regarding denial. Had office visit today with Arleen Enrique MD \"Will try mounjaro 2.5 mg - zepbound not covered.\"   A1C today was 6.4.    Kianna Corona RN    "

## 2024-12-04 NOTE — TELEPHONE ENCOUNTER
Retail Pharmacy Prior Authorization Team   Phone: [phone]    PRIOR AUTHORIZATION DENIED    Medication: MOUNJARO 2.5 MG/0.5ML SC SOAJ  Insurance Company: [employer] ([facility]) - Phone [phone] Fax [phone]  Denial Date: 12/4/2024  Denial Reason(s): MUST HAVE TYPE 2 DIABETES AND PROVIDE DOCUMENTATION CONFIRMING DX      Appeal Information: IF THE PROVIDER WOULD LIKE TO APPEAL THIS DECISION PLEASE PROVIDE THE PA TEAM WITH A LETTER OF MEDICAL NECESSITY      Patient Notified: NO

## 2024-12-04 NOTE — TELEPHONE ENCOUNTER
Patient calling back.  Advised of PA denial and provider message.  Patient agrees with plan.  Jodee Black RN

## 2024-12-04 NOTE — PROGRESS NOTES
Preventive Care Visit  [facility]  Arleen Enrique MD, Family Medicine  Dec 4, 2024      Assessment & Plan     Routine general medical examination at a health care facility    - INFLUENZA VACCINE TRIVALENT(FLUBLOK)  - ZOSTER RECOMBINANT ADJUVANTED (SHINGRIX)  - Lipid panel reflex to direct LDL Non-fasting  - Comprehensive metabolic panel (BMP + Alb, Alk Phos, ALT, AST, Total. Bili, TP)  - Lipid panel reflex to direct LDL Non-fasting  - Comprehensive metabolic panel (BMP + Alb, Alk Phos, ALT, AST, Total. Bili, TP)    Benign hypertension  Under good control  Refills per [facility]    - PRIMARY CARE FOLLOW-UP SCHEDULING  - lisinopril (ZESTRIL) 20 MG tablet  Dispense: 90 tablet; Refill: 4  - Comprehensive metabolic panel (BMP + Alb, Alk Phos, ALT, AST, Total. Bili, TP)  - MOUNJARO 2.5 MG/0.5ML SOAJ  Dispense: 2 mL; Refill: 5  - Comprehensive metabolic panel (BMP + Alb, Alk Phos, ALT, AST, Total. Bili, TP)    Class 1 obesity due to excess calories without serious comorbidity with body mass index (BMI) of 34.0 to 34.9 in adult  Will try mounjaro 2.5 mg - zepbound not covered    - Comprehensive metabolic panel (BMP + Alb, Alk Phos, ALT, AST, Total. Bili, TP)  - TSH with free T4 reflex  - MOUNJARO 2.5 MG/0.5ML SOAJ  Dispense: 2 mL; Refill: 5  - Comprehensive metabolic panel (BMP + Alb, Alk Phos, ALT, AST, Total. Bili, TP)  - TSH with free T4 reflex    History of Hodgkin's disease  CBC    - Comprehensive metabolic panel (BMP + Alb, Alk Phos, ALT, AST, Total. Bili, TP)  - CBC with platelets  - Comprehensive metabolic panel (BMP + Alb, Alk Phos, ALT, AST, Total. Bili, TP)  - CBC with platelets    Hyperlipidemia LDL goal <160  Under fair control  Could increase dose or switch to 20 mg of rosuvastatin  Will see results first    - atorvastatin (LIPITOR) 20 MG tablet  Dispense: 90 tablet; Refill: 4  - Lipid panel reflex to direct LDL Non-fasting  - Comprehensive metabolic panel (BMP + Alb, Alk Phos, ALT,  "AST, Total. Bili, TP)  - MOUNJARO 2.5 MG/0.5ML SOAJ  Dispense: 2 mL; Refill: 5  - Lipid panel reflex to direct LDL Non-fasting  - Comprehensive metabolic panel (BMP + Alb, Alk Phos, ALT, AST, Total. Bili, TP)    Prediabetes    - Hemoglobin A1c  - MOUNJARO 2.5 MG/0.5ML SOAJ  Dispense: 2 mL; Refill: 5  - Hemoglobin A1c      Patient has been advised of split billing requirements and indicates understanding: Yes        BMI  Estimated body mass index is 34.08 kg/m  as calculated from the following:    Height as of this encounter: 1.753 m (5' 9\").    Weight as of this encounter: 104.7 kg (230 lb 12.8 oz).   Weight management plan: Discussed healthy diet and exercise guidelines mounjaro    Counseling  Appropriate preventive services were addressed with this patient via screening, questionnaire, or discussion as appropriate for fall prevention, nutrition, physical activity, Tobacco-use cessation, social engagement, weight loss and cognition.  Checklist reviewing preventive services available has been given to the patient.  Reviewed patient's diet, addressing concerns and/or questions.   The patient reports drinking more than 3 alcoholic drinks per day and/or more than 7 drhnks per week. The patient was counseled and given information about possible harmful effects of excessive alcohol intake.    FUTURE APPOINTMENTS:       - Follow-up visit in 3 months for med check   Work on weight loss  Regular exercise  See Patient Instructions    Florentino Caicedo is a 49 year old, presenting for the following:  Physical (Annual checkup // GOP 1 to loose weight)  He is interested in something or weight loss.   He has been prediabetic the last few times.   He has tried to lose weight and has not been able to.   He has high lipids and hypertension .   He is doing well on his meds and has no concerns.   He does need some refills on these.         12/4/2024     9:36 AM   Additional Questions   Roomed by jazzy ryan    "       HPI    See above       Health Care Directive  Patient does not have a Health Care Directive: have discussed in the past        12/4/2024   General Health   How would you rate your overall physical health? (!) FAIR   Feel stress (tense, anxious, or unable to sleep) Only a little      (!) STRESS CONCERN      12/4/2024   Nutrition   Three or more servings of calcium each day? (!) NO   Diet: Regular (no restrictions)   How many servings of fruit and vegetables per day? (!) 0-1   How many sweetened beverages each day? (!) 3            12/4/2024   Exercise   Days per week of moderate/strenous exercise Patient declined   Average minutes spent exercising at this level Patient declined            12/4/2024   Social Factors   Frequency of gathering with friends or relatives Patient declined   Worry food won't last until get money to buy more No   Food not last or not have enough money for food? No   Do you have housing? (Housing is defined as stable permanent housing and does not include staying ouside in a car, in a tent, in an abandoned building, in an overnight shelter, or couch-surfing.) Yes   Are you worried about losing your housing? No   Lack of transportation? No   Unable to get utilities (heat,electricity)? No            12/4/2024   Fall Risk   Fallen 2 or more times in the past year? No    Trouble with walking or balance? No        Patient-reported          12/4/2024   Dental   Dentist two times every year? Yes            12/4/2024   TB Screening   Were you born outside of the [place]? No              Today's PHQ-2 Score:       12/4/2024     9:44 AM   PHQ-2 ( 1999 [employer])   Q1: Little interest or pleasure in doing things 0   Q2: Feeling down, depressed or hopeless 0   PHQ-2 Score 0         12/4/2024   Substance Use   Alcohol more than 3/day or more than 7/wk Yes   How often do you have a drink containing alcohol 4 or more times a week   How many alcohol drinks on typical day 1 or 2   How often do you have 5+ drinks  at one occasion Never   Audit 2/3 Score 0   How often not able to stop drinking once started Never   How often failed to do what normally expected Never   How often needed first drink in am after a heavy drinking session Never   How often feeling of guilt or remorse after drinking Never   How often unable to remember what happened the night before Never   Have you or someone else been injured because of your drinking No   Has anyone been concerned or suggested you cut down on drinking No   TOTAL SCORE - AUDIT 4   Do you use any other substances recreationally? No        Social History     Tobacco Use    Smoking status: Former     Current packs/day: 0.00     Types: Cigarettes     Quit date: 1996     Years since quittin.9    Smokeless tobacco: Never    Tobacco comments:     5 years - 1 pack per week   Vaping Use    Vaping status: Never Used   Substance Use Topics    Alcohol use: Yes     Alcohol/week: 0.0 standard drinks of alcohol     Comment: rarely    Drug use: No           2024   STI Screening   New sexual partner(s) since last STI/HIV test? No      ASCVD Risk   The 10-year ASCVD risk score (Mila REEVES, et al., 2019) is: 6.2%    Values used to calculate the score:      Age: 49 years      Sex: Male      Is Non- : No      Diabetic: No      Tobacco smoker: No      Systolic Blood Pressure: 128 mmHg      Is BP treated: Yes      HDL Cholesterol: 33 mg/dL      Total Cholesterol: 198 mg/dL           Reviewed and updated as needed this visit by Provider                    Labs reviewed in EPIC  BP Readings from Last 3 Encounters:   24 128/82   03/15/23 126/78   10/21/22 118/72    Wt Readings from Last 3 Encounters:   24 104.7 kg (230 lb 12.8 oz)   03/15/23 105.3 kg (232 lb 4 oz)   10/21/22 100.2 kg (221 lb)                  Patient Active Problem List   Diagnosis    History of Hodgkin's disease    Hyperlipidemia LDL goal <160    Glaucoma    Arthropathy of multiple  sites    Shoulder pain    Personal history of radiation therapy    Benign hypertension    Osteoarthritis of glenohumeral joint    Class 1 obesity due to excess calories without serious comorbidity with body mass index (BMI) of 34.0 to 34.9 in adult    Steatosis of liver    Hx of shoulder surgery    Intervertebral disk disease    Chronic bilateral low back pain with bilateral sciatica    Tinnitus, right    Thyroid nodule    Neuropathy    Allergic rhinitis, unspecified seasonality, unspecified trigger     Past Surgical History:   Procedure Laterality Date    ARTHROSCOPY SHOULDER DECOMPRESSION  12/15/2011    Procedure:ARTHROSCOPY SHOULDER DECOMPRESSION; Right Shoulder Arthroscopic Assessment, Subcromial Decompression, microfracture cohondrail defect repair  ; Surgeon:CAROLE WALKER; Location:[facility] OR    HC KNEE SCOPE,MED/LAT MENISECTOMY  2004    medial meniscectomy, right knee    HC VASECTOMY UNILAT/BILAT W POSTOP SEMEN      MICRODISCECTOMY LUMBAR  10/2022    TCO - done at [facility]    SURGICAL HISTORY OF -       resection Hodgkins, salivary glands, node dissection    ZZC APPENDECTOMY         Social History     Tobacco Use    Smoking status: Former     Current packs/day: 0.00     Types: Cigarettes     Quit date: 1996     Years since quittin.9    Smokeless tobacco: Never    Tobacco comments:     5 years - 1 pack per week   Substance Use Topics    Alcohol use: Yes     Alcohol/week: 0.0 standard drinks of alcohol     Comment: rarely     Family History   Problem Relation Age of Onset    Cancer Mother     Gallbladder Disease Mother     Melanoma Mother     Gallbladder Disease Father     C.A.D. Maternal Grandfather     Diabetes Maternal Aunt     Diabetes Type 2  Paternal Aunt     Colon Cancer No family hx of          Current Outpatient Medications   Medication Sig Dispense Refill    atorvastatin (LIPITOR) 20 MG tablet Take 1 tablet (20 mg) by mouth daily 90 tablet 2    cetirizine (ZYRTEC) 10 MG tablet Take 10 mg by  "mouth daily      DULoxetine (CYMBALTA) 60 MG capsule Take 60 mg by mouth daily      fluticasone (FLONASE) 50 MCG/ACT nasal spray Spray 1-2 sprays into both nostrils daily 16 g 8    latanoprost (XALATAN) 0.005 % ophthalmic solution Place 1 drop into both eyes At Bedtime.      lisinopril (ZESTRIL) 20 MG tablet TAKE ONE TABLET BY MOUTH EVERY DAY 90 tablet 0    pregabalin (LYRICA) 150 MG capsule Take 150 capsules by mouth 2 times daily      sildenafil (VIAGRA) 50 MG tablet TAKE ONE TABLET BY MOUTH EVERY DAY AS NEEDED 12 tablet 1    tiZANidine (ZANAFLEX) 4 MG tablet Take 1 tablet (4 mg) by mouth at bedtime as needed, may repeat once for muscle spasms 30 tablet 11     Allergies   Allergen Reactions    No Known Allergies          Review of Systems  Constitutional, HEENT, cardiovascular, pulmonary, gi and gu systems are negative, except as otherwise noted.     Objective    Exam  /82 (BP Location: Left arm, Patient Position: Sitting, Cuff Size: Adult Regular)   Pulse 78   Temp 98.3  F (36.8  C) (Oral)   Resp 12   Ht 1.753 m (5' 9\")   Wt 104.7 kg (230 lb 12.8 oz)   SpO2 97%   BMI 34.08 kg/m     Estimated body mass index is 34.08 kg/m  as calculated from the following:    Height as of this encounter: 1.753 m (5' 9\").    Weight as of this encounter: 104.7 kg (230 lb 12.8 oz).    Physical Exam  GENERAL: alert and no distress  EYES: Eyes grossly normal to inspection, PERRL and conjunctivae and sclerae normal  HENT: ear canals and TM's normal, nose and mouth without ulcers or lesions  NECK: no adenopathy, no asymmetry, masses, or scars  RESP: lungs clear to auscultation - no rales, rhonchi or wheezes  CV: regular rate and rhythm, normal S1 S2, no S3 or S4, no murmur, click or rub, no peripheral edema  ABDOMEN: soft, nontender, no hepatosplenomegaly, no masses and bowel sounds normal  MS: no gross musculoskeletal defects noted, no edema  SKIN: no suspicious lesions or rashes  NEURO: Normal strength and tone, mentation " intact and speech normal  PSYCH: mentation appears normal, affect normal/bright  LYMPH: no cervical, supraclavicular, axillary, or inguinal adenopathy        Signed Electronically by: Arleen Enrique MD

## 2024-12-04 NOTE — TELEPHONE ENCOUNTER
RN attempted (attempt #1) to contact patient  Left  requesting call back to [phone] and ask to speak with a member of the care team, awaiting call back at this time     See routing comment from Dr. Enrique:      Dixie WINSLOW RN  [facility]

## 2024-12-04 NOTE — PATIENT INSTRUCTIONS
Patient Education   Preventive Care Advice   This is general advice given by our system to help you stay healthy. However, your care team may have specific advice just for you. Please talk to your care team about your preventive care needs.  Nutrition  Eat 5 or more servings of fruits and vegetables each day.  Try wheat bread, brown rice and whole grain pasta (instead of white bread, rice, and pasta).  Get enough calcium and vitamin D. Check the label on foods and aim for 100% of the RDA (recommended daily allowance).  Lifestyle  Exercise at least 150 minutes each week  (30 minutes a day, 5 days a week).  Do muscle strengthening activities 2 days a week. These help control your weight and prevent disease.  No smoking.  Wear sunscreen to prevent skin cancer.  Have a dental exam and cleaning every 6 months.  Yearly exams  See your health care team every year to talk about:  Any changes in your health.  Any medicines your care team has prescribed.  Preventive care, family planning, and ways to prevent chronic diseases.  Shots (vaccines)   HPV shots (up to age 26), if you've never had them before.  Hepatitis B shots (up to age 59), if you've never had them before.  COVID-19 shot: Get this shot when it's due.  Flu shot: Get a flu shot every year.  Tetanus shot: Get a tetanus shot every 10 years.  Pneumococcal, hepatitis A, and RSV shots: Ask your care team if you need these based on your risk.  Shingles shot (for age 50 and up)  General health tests  Diabetes screening:  Starting at age 35, Get screened for diabetes at least every 3 years.  If you are younger than age 35, ask your care team if you should be screened for diabetes.  Cholesterol test: At age 39, start having a cholesterol test every 5 years, or more often if advised.  Bone density scan (DEXA): At age 50, ask your care team if you should have this scan for osteoporosis (brittle bones).  Hepatitis C: Get tested at least once in your life.  STIs (sexually  transmitted infections)  Before age 24: Ask your care team if you should be screened for STIs.  After age 24: Get screened for STIs if you're at risk. You are at risk for STIs (including HIV) if:  You are sexually active with more than one person.  You don't use condoms every time.  You or a partner was diagnosed with a sexually transmitted infection.  If you are at risk for HIV, ask about PrEP medicine to prevent HIV.  Get tested for HIV at least once in your life, whether you are at risk for HIV or not.  Cancer screening tests  Cervical cancer screening: If you have a cervix, begin getting regular cervical cancer screening tests starting at age 21.  Breast cancer scan (mammogram): If you've ever had breasts, begin having regular mammograms starting at age 40. This is a scan to check for breast cancer.  Colon cancer screening: It is important to start screening for colon cancer at age 45.  Have a colonoscopy test every 10 years (or more often if you're at risk) Or, ask your provider about stool tests like a FIT test every year or Cologuard test every 3 years.  To learn more about your testing options, visit:   .  For help making a decision, visit:   https://bit.ly/wv15727.  Prostate cancer screening test: If you have a prostate, ask your care team if a prostate cancer screening test (PSA) at age 55 is right for you.  Lung cancer screening: If you are a current or former smoker ages 50 to 80, ask your care team if ongoing lung cancer screenings are right for you.  For informational purposes only. Not to replace the advice of your health care provider. Copyright   2023 [facility]. All rights reserved. Clinically reviewed by the [facility]itions Program. [employer] 168674 - REV 01/24.  9 Ways to Cut Back on Drinking  Maybe you've found yourself drinking more alcohol than you'd prefer. If you want to cut back, here are some ideas to try.    Think before you drink.  Do you really want a drink,  "or is it just a habit? If you're used to having a drink at a certain time, try doing something else then.     Look for substitutes.  Find some no-alcohol drinks that you enjoy, like flavored seltzer water, tea with honey, or tonic with a slice of lime. Or try alcohol-free beer or \"virgin\" cocktails (without the alcohol).     Drink more water.  Use water to quench your thirst. Drink a glass of water before you have any alcohol. Have another glass along with every drink or between drinks.     Shrink your drink.  For example, have a bottle of beer instead of a pint. Use a smaller glass for wine. Choose drinks with lower alcohol content (ABV%). Or use less liquor and more mixer in cocktails.     Slow down.  It's easy to drink quickly and without thinking about it. Pay attention, and make each drink last longer.     Do the math.  Total up how much you spend on alcohol each month. How much is that a year? If you cut back, what could you do with the money you save?     Take a break.  Choose a day or two each week when you won't drink at all. Notice how you feel on those days, physically and emotionally. How did you sleep? Do you feel better? Over time, add more break days.     Count calories.  Would you like to lose some weight? For some people that's a good motivator for cutting back. Figure out how many calories are in each drink. How many does that add up to in a day? In a week? In a month?     Practice saying no.  Be ready when someone offers you a drink. Try: \"Thanks, I've had enough.\" Or \"Thanks, but I'm cutting back.\" Or \"No, thanks. I feel better when I drink less.\"   Current as of: November 15, 2023  Content Version: 14.2 2024 [employer].   Care instructions adapted under license by your healthcare professional. If you have questions about a medical condition or this instruction, always ask your healthcare professional. [employer] disclaims any warranty or liability for your use of " this information.

## 2024-12-30 ENCOUNTER — OFFICE VISIT (OUTPATIENT)
Dept: URGENT CARE | Facility: URGENT CARE | Age: 49
End: 2024-12-30
Payer: COMMERCIAL

## 2024-12-30 ENCOUNTER — ANCILLARY PROCEDURE (OUTPATIENT)
Dept: GENERAL RADIOLOGY | Facility: CLINIC | Age: 49
End: 2024-12-30
Attending: PHYSICIAN ASSISTANT
Payer: COMMERCIAL

## 2024-12-30 VITALS
DIASTOLIC BLOOD PRESSURE: 100 MMHG | TEMPERATURE: 98.2 F | OXYGEN SATURATION: 95 % | WEIGHT: 230 LBS | SYSTOLIC BLOOD PRESSURE: 152 MMHG | RESPIRATION RATE: 14 BRPM | HEART RATE: 77 BPM | BODY MASS INDEX: 33.97 KG/M2

## 2024-12-30 DIAGNOSIS — M54.9 ACUTE UPPER BACK PAIN: ICD-10-CM

## 2024-12-30 DIAGNOSIS — R05.1 ACUTE COUGH: ICD-10-CM

## 2024-12-30 DIAGNOSIS — J06.9 UPPER RESPIRATORY TRACT INFECTION, UNSPECIFIED TYPE: ICD-10-CM

## 2024-12-30 DIAGNOSIS — J18.9 PNEUMONIA OF RIGHT MIDDLE LOBE DUE TO INFECTIOUS ORGANISM: Primary | ICD-10-CM

## 2024-12-30 DIAGNOSIS — I10 ESSENTIAL HYPERTENSION: ICD-10-CM

## 2024-12-30 PROCEDURE — 99214 OFFICE O/P EST MOD 30 MIN: CPT | Performed by: PHYSICIAN ASSISTANT

## 2024-12-30 PROCEDURE — 71046 X-RAY EXAM CHEST 2 VIEWS: CPT | Mod: TC | Performed by: RADIOLOGY

## 2024-12-30 RX ORDER — AZITHROMYCIN 250 MG/1
TABLET, FILM COATED ORAL
Qty: 6 TABLET | Refills: 0 | Status: SHIPPED | OUTPATIENT
Start: 2024-12-30 | End: 2025-01-04

## 2024-12-30 ASSESSMENT — ENCOUNTER SYMPTOMS
BACK PAIN: 1
COUGH: 1
FEVER: 1
NECK PAIN: 1

## 2024-12-30 NOTE — PATIENT INSTRUCTIONS
Pneumonia  Expect to see improvement of symptoms in 2-3 after starting antibiotics. Follow up in the clinic or ED if symptoms worsen. Complete resolution of symptoms expected after 7 days    Blood pressure  Follow up with primary care provider for blood pressure recheck  I have completed a primary care schedule follow-up.  You will get a call to schedule an appointment within a week    Neck and upper back pain  I have referred you to Spine specialists.  Follow-up with them for evaluation of your back pain and neck pain

## 2024-12-30 NOTE — PROGRESS NOTES
Assessment & Plan       1. Pneumonia of right middle lobe due to infectious organism (Primary)    - amoxicillin-clavulanate (AUGMENTIN) 875-125 MG tablet; Take 1 tablet by mouth 2 times daily for 7 days.  Dispense: 14 tablet; Refill: 0  - azithromycin (ZITHROMAX) 250 MG tablet; Take 2 tablets (500 mg) by mouth daily for 1 day, THEN 1 tablet (250 mg) daily for 4 days.  Dispense: 6 tablet; Refill: 0    2. Upper respiratory tract infection, unspecified type    -Supportive care recommended (rest, adequate fluid intake and analgesics such as acetaminophen and ibuprofen)    3. Acute cough    - XR Chest 2 Views shows pneumonia on the right middle lobe per radiology report    4. Essential hypertension    -Patient will follow-up with primary care provider for blood pressure management.  Blood pressure was 152/100 in the clinic.  Patient is reported elevated blood pressure readings at home.  I completed a primary care schedule follow-up.    5. Acute upper back pain    -Patient has upper back pain with numbness and tingling in the upper extremity.  I referred patient to Spine specialist for further evaluation  - Spine  Referral; Future     Results for orders placed or performed in visit on 12/30/24   XR Chest 2 Views     Status: None    Narrative    XR CHEST 2 VIEWS 12/30/2024 10:26 AM    HISTORY: cough, fever . r/o pnuemonia; Acute cough    COMPARISON: 2/18/2019      Impression    IMPRESSION: Mild linear opacities in the right middle lobe, could  represent pneumonia or atelectasis. No pleural effusion or  pneumothorax. Normal heart size. 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    HEMANT TAVERAS MD         SYSTEM ID:  [id number]       Patient Instructions   Pneumonia  Expect to see improvement of symptoms in 2-3 after starting antibiotics. Follow up in the clinic or ED if symptoms worsen. Complete resolution of symptoms expected after 7 days    Blood pressure  Follow up with primary care provider for blood  pressure recheck  I have completed a primary care schedule follow-up.  You will get a call to schedule an appointment within a week    Neck and upper back pain  I have referred you to Spine specialists.  Follow-up with them for evaluation of your back pain and neck pain    Return for Follow up, PCP.    At the end of the encounter, I discussed results, diagnosis, medications. Discussed red flags for immediate return to clinic/ER, as well as indications for follow up if no improvement. Patient understood and agreed to plan. Patient was stable for discharge.    Florentino Caicedo is a 49 year old male who presents to clinic today patient reports for the following health issues:  Chief Complaint   Patient presents with    Sick      Dry Cough x 3-4 days /Fever x 7 days -, vomited, sweats, stiff neck x 6 days, fatigue, no appetite on and off  -- day OTC meds for the URI    Hypertension     Ben BP  x 3 days -- he has been high at home with his HOME BP machine     HPI    Patient reports cold and cough symptoms which started 7 days ago.  Symptoms started with a fever, congestion.  He now has a cough which has been ongoing for 3 to 4 days.  He reports episodes of vomiting, sweats.  He also reports upper back pain and posterior neck pain.  He has numbness and tingling in bilateral upper extremity.  He has had a cervical back pain in the past.  He also reports blood pressure is high.  Patient has a history of essential hypertension.  He reports elevated blood pressure readings at home.  Blood pressure was 150/100 in the clinic.    Review of Systems   Constitutional:  Positive for fever.   HENT:  Positive for congestion.    Respiratory:  Positive for cough.    Musculoskeletal:  Positive for back pain and neck pain.       Problem List:  2023-03: Tinnitus, right  2023-03: Thyroid nodule  2023-03: Neuropathy  2023-03: Allergic rhinitis, unspecified seasonality, unspecified   trigger  2022-06: Intervertebral disk  disease  2022: Chronic bilateral low back pain with bilateral sciatica  2019: Steatosis of liver  2019: Class 1 obesity due to excess calories without serious   comorbidity with body mass index (BMI) of 34.0 to 34.9 in adult  2013: Osteoarthritis of glenohumeral joint  2013: Hx of shoulder surgery  2012: Benign hypertension  2012: Personal history of radiation therapy  2011: Shoulder pain  2009: Pain in joint, shoulder region  2008: Arthropathy of multiple sites  2008: Glaucoma  2005: Recurrent Depression/Anxiety, Full Remission  2004: Mild Depression/Anxiety  2004: Hyperlipidemia LDL goal <160  2003: History of Hodgkin's disease      Past Medical History:   Diagnosis Date    Glaucoma     Other and unspecified hyperlipidemia     Personal history of Hodgkin's disease 2000    radiation rx, stage 1A, neck mass    Recurrent Depression/Anxiety 2005    Recurrent Depression/Anxiety, Full Remission 2005    Thyroid nodule 2022    ultrasound done 3/2023 showed 3 that had benign features - no follow up is needed    Typical Chest Pain, Related to Anxiety 2004    negative workup including EKG and stress echo        Social History     Tobacco Use    Smoking status: Former     Current packs/day: 0.00     Types: Cigarettes     Quit date: 1996     Years since quittin.0    Smokeless tobacco: Never    Tobacco comments:     5 years - 1 pack per week   Substance Use Topics    Alcohol use: Yes     Alcohol/week: 0.0 standard drinks of alcohol     Comment: rarely           Objective    BP (!) 152/100 (BP Location: Right arm, Patient Position: Chair, Cuff Size: Adult Large)   Pulse 77   Temp 98.2  F (36.8  C) (Oral)   Resp 14   Wt 104.3 kg (230 lb)   SpO2 95%   BMI 33.97 kg/m    Physical Exam  Constitutional:       Appearance: Normal appearance.   HENT:      Head: Normocephalic.      Right Ear: Tympanic membrane normal.      Left Ear: Tympanic membrane  normal.      Mouth/Throat:      Mouth: Mucous membranes are moist.      Pharynx: Oropharynx is clear. Uvula midline. No posterior oropharyngeal erythema.   Cardiovascular:      Rate and Rhythm: Normal rate and regular rhythm.   Pulmonary:      Effort: Pulmonary effort is normal.      Breath sounds: Normal breath sounds.   Musculoskeletal:      Cervical back: Normal.   Lymphadenopathy:      Head:      Right side of head: No submental, submandibular or tonsillar adenopathy.      Left side of head: No submental, submandibular or tonsillar adenopathy.      Cervical: No cervical adenopathy.      Right cervical: No superficial cervical adenopathy.     Left cervical: No superficial cervical adenopathy.   Skin:     General: Skin is warm and dry.      Findings: No rash.   Neurological:      Mental Status: He is alert.      Sensory: Sensation is intact.      Motor: Motor function is intact.   Psychiatric:         Mood and Affect: Mood normal.         Behavior: Behavior normal.              Mere Gleason PA-C

## 2025-02-24 ENCOUNTER — ALLIED HEALTH/NURSE VISIT (OUTPATIENT)
Dept: FAMILY MEDICINE | Facility: CLINIC | Age: 50
End: 2025-02-24
Payer: COMMERCIAL

## 2025-02-24 DIAGNOSIS — Z23 ENCOUNTER FOR IMMUNIZATION: Primary | ICD-10-CM

## 2025-02-24 PROCEDURE — 90750 HZV VACC RECOMBINANT IM: CPT

## 2025-02-24 PROCEDURE — 90471 IMMUNIZATION ADMIN: CPT

## 2025-02-24 PROCEDURE — 99207 PR NO CHARGE NURSE ONLY: CPT

## 2025-02-24 NOTE — PROGRESS NOTES
Prior to immunization administration, verified patients identity using patient s name and date of birth. Please see Immunization Activity for additional information.     Is the patient's temperature normal (100.5 or less)? Yes     Patient MEETS CRITERIA. PROCEED with vaccine administration.      Patient instructed to remain in clinic for 15 minutes afterwards, and to report any adverse reactions.      Link to Ancillary Visit Immunization Standing Orders SmartSet     Screening performed by Christina West CMA on 2/24/2025 at 8:57 AM.

## 2025-06-17 DIAGNOSIS — G47.9 REPETITIVE INTRUSIONS OF SLEEP: Primary | ICD-10-CM

## 2025-06-17 DIAGNOSIS — Z13.29 SCREENING FOR THYROID DISORDER: ICD-10-CM

## 2025-06-17 DIAGNOSIS — Z13.21 SCREENING FOR MALNUTRITION: ICD-10-CM

## 2025-06-17 DIAGNOSIS — Z13.228 SCREENING FOR PHENYLKETONURIA (PKU): ICD-10-CM

## 2025-06-17 DIAGNOSIS — Z13.0 SCREENING FOR IRON DEFICIENCY ANEMIA: ICD-10-CM

## 2025-06-17 DIAGNOSIS — F41.1 GENERALIZED ANXIETY DISORDER: ICD-10-CM

## 2025-06-17 DIAGNOSIS — F33.1 MAJOR DEPRESSIVE DISORDER, RECURRENT EPISODE, MODERATE (H): ICD-10-CM
